# Patient Record
Sex: FEMALE | Race: WHITE | HISPANIC OR LATINO | Employment: UNEMPLOYED | URBAN - METROPOLITAN AREA
[De-identification: names, ages, dates, MRNs, and addresses within clinical notes are randomized per-mention and may not be internally consistent; named-entity substitution may affect disease eponyms.]

---

## 2019-07-15 ENCOUNTER — HOSPITAL ENCOUNTER (EMERGENCY)
Facility: MEDICAL CENTER | Age: 24
End: 2019-07-15
Attending: EMERGENCY MEDICINE

## 2019-07-15 ENCOUNTER — APPOINTMENT (OUTPATIENT)
Dept: RADIOLOGY | Facility: MEDICAL CENTER | Age: 24
End: 2019-07-15
Attending: EMERGENCY MEDICINE

## 2019-07-15 VITALS
TEMPERATURE: 98.4 F | BODY MASS INDEX: 34.71 KG/M2 | OXYGEN SATURATION: 99 % | HEIGHT: 55 IN | WEIGHT: 150 LBS | SYSTOLIC BLOOD PRESSURE: 108 MMHG | DIASTOLIC BLOOD PRESSURE: 75 MMHG | RESPIRATION RATE: 16 BRPM | HEART RATE: 66 BPM

## 2019-07-15 DIAGNOSIS — N12 PYELONEPHRITIS: ICD-10-CM

## 2019-07-15 LAB
ALBUMIN SERPL BCP-MCNC: 4.1 G/DL (ref 3.2–4.9)
ALBUMIN/GLOB SERPL: 1.3 G/DL
ALP SERPL-CCNC: 64 U/L (ref 30–99)
ALT SERPL-CCNC: 10 U/L (ref 2–50)
ANION GAP SERPL CALC-SCNC: 5 MMOL/L (ref 0–11.9)
APPEARANCE UR: ABNORMAL
AST SERPL-CCNC: 10 U/L (ref 12–45)
BACTERIA #/AREA URNS HPF: ABNORMAL /HPF
BASOPHILS # BLD AUTO: 0.3 % (ref 0–1.8)
BASOPHILS # BLD: 0.03 K/UL (ref 0–0.12)
BILIRUB SERPL-MCNC: 0.3 MG/DL (ref 0.1–1.5)
BILIRUB UR QL STRIP.AUTO: NEGATIVE
BUN SERPL-MCNC: 16 MG/DL (ref 8–22)
CALCIUM SERPL-MCNC: 9.3 MG/DL (ref 8.5–10.5)
CHLORIDE SERPL-SCNC: 107 MMOL/L (ref 96–112)
CO2 SERPL-SCNC: 25 MMOL/L (ref 20–33)
COLOR UR: YELLOW
CREAT SERPL-MCNC: 0.71 MG/DL (ref 0.5–1.4)
EOSINOPHIL # BLD AUTO: 0.19 K/UL (ref 0–0.51)
EOSINOPHIL NFR BLD: 2 % (ref 0–6.9)
EPI CELLS #/AREA URNS HPF: ABNORMAL /HPF
ERYTHROCYTE [DISTWIDTH] IN BLOOD BY AUTOMATED COUNT: 42.2 FL (ref 35.9–50)
GLOBULIN SER CALC-MCNC: 3.1 G/DL (ref 1.9–3.5)
GLUCOSE SERPL-MCNC: 103 MG/DL (ref 65–99)
GLUCOSE UR STRIP.AUTO-MCNC: NEGATIVE MG/DL
HCG SERPL QL: NEGATIVE
HCT VFR BLD AUTO: 37.5 % (ref 37–47)
HGB BLD-MCNC: 11.9 G/DL (ref 12–16)
HYALINE CASTS #/AREA URNS LPF: ABNORMAL /LPF
IMM GRANULOCYTES # BLD AUTO: 0.02 K/UL (ref 0–0.11)
IMM GRANULOCYTES NFR BLD AUTO: 0.2 % (ref 0–0.9)
KETONES UR STRIP.AUTO-MCNC: NEGATIVE MG/DL
LEUKOCYTE ESTERASE UR QL STRIP.AUTO: ABNORMAL
LIPASE SERPL-CCNC: 39 U/L (ref 11–82)
LYMPHOCYTES # BLD AUTO: 4.32 K/UL (ref 1–4.8)
LYMPHOCYTES NFR BLD: 45.7 % (ref 22–41)
MCH RBC QN AUTO: 28.6 PG (ref 27–33)
MCHC RBC AUTO-ENTMCNC: 31.7 G/DL (ref 33.6–35)
MCV RBC AUTO: 90.1 FL (ref 81.4–97.8)
MICRO URNS: ABNORMAL
MONOCYTES # BLD AUTO: 0.61 K/UL (ref 0–0.85)
MONOCYTES NFR BLD AUTO: 6.4 % (ref 0–13.4)
NEUTROPHILS # BLD AUTO: 4.29 K/UL (ref 2–7.15)
NEUTROPHILS NFR BLD: 45.4 % (ref 44–72)
NITRITE UR QL STRIP.AUTO: NEGATIVE
NRBC # BLD AUTO: 0 K/UL
NRBC BLD-RTO: 0 /100 WBC
PH UR STRIP.AUTO: 5.5 [PH]
PLATELET # BLD AUTO: 231 K/UL (ref 164–446)
PMV BLD AUTO: 9.7 FL (ref 9–12.9)
POTASSIUM SERPL-SCNC: 3.7 MMOL/L (ref 3.6–5.5)
PROT SERPL-MCNC: 7.2 G/DL (ref 6–8.2)
PROT UR QL STRIP: 100 MG/DL
RBC # BLD AUTO: 4.16 M/UL (ref 4.2–5.4)
RBC # URNS HPF: ABNORMAL /HPF
RBC UR QL AUTO: ABNORMAL
RENAL EPI CELLS #/AREA URNS HPF: ABNORMAL /HPF
SODIUM SERPL-SCNC: 137 MMOL/L (ref 135–145)
SP GR UR STRIP.AUTO: 1.04
UROBILINOGEN UR STRIP.AUTO-MCNC: 1 MG/DL
WBC # BLD AUTO: 9.5 K/UL (ref 4.8–10.8)
WBC #/AREA URNS HPF: ABNORMAL /HPF

## 2019-07-15 PROCEDURE — 87086 URINE CULTURE/COLONY COUNT: CPT

## 2019-07-15 PROCEDURE — 99285 EMERGENCY DEPT VISIT HI MDM: CPT

## 2019-07-15 PROCEDURE — 80053 COMPREHEN METABOLIC PANEL: CPT

## 2019-07-15 PROCEDURE — 74176 CT ABD & PELVIS W/O CONTRAST: CPT

## 2019-07-15 PROCEDURE — 700111 HCHG RX REV CODE 636 W/ 250 OVERRIDE (IP): Performed by: EMERGENCY MEDICINE

## 2019-07-15 PROCEDURE — 96375 TX/PRO/DX INJ NEW DRUG ADDON: CPT

## 2019-07-15 PROCEDURE — 700105 HCHG RX REV CODE 258: Performed by: EMERGENCY MEDICINE

## 2019-07-15 PROCEDURE — 81001 URINALYSIS AUTO W/SCOPE: CPT

## 2019-07-15 PROCEDURE — 85025 COMPLETE CBC W/AUTO DIFF WBC: CPT

## 2019-07-15 PROCEDURE — 84703 CHORIONIC GONADOTROPIN ASSAY: CPT

## 2019-07-15 PROCEDURE — 96365 THER/PROPH/DIAG IV INF INIT: CPT

## 2019-07-15 PROCEDURE — 83690 ASSAY OF LIPASE: CPT

## 2019-07-15 RX ORDER — CEFDINIR 300 MG/1
300 CAPSULE ORAL 2 TIMES DAILY
Qty: 18 CAP | Refills: 0 | Status: SHIPPED | OUTPATIENT
Start: 2019-07-16 | End: 2019-07-25

## 2019-07-15 RX ORDER — KETOROLAC TROMETHAMINE 30 MG/ML
30 INJECTION, SOLUTION INTRAMUSCULAR; INTRAVENOUS ONCE
Status: COMPLETED | OUTPATIENT
Start: 2019-07-15 | End: 2019-07-15

## 2019-07-15 RX ORDER — PROMETHAZINE HYDROCHLORIDE 25 MG/1
25 TABLET ORAL EVERY 6 HOURS PRN
Qty: 10 TAB | Refills: 0 | Status: ON HOLD | OUTPATIENT
Start: 2019-07-15 | End: 2021-03-27

## 2019-07-15 RX ADMIN — KETOROLAC TROMETHAMINE 30 MG: 30 INJECTION, SOLUTION INTRAMUSCULAR at 22:32

## 2019-07-15 RX ADMIN — CEFTRIAXONE SODIUM 1 G: 1 INJECTION, POWDER, FOR SOLUTION INTRAMUSCULAR; INTRAVENOUS at 22:32

## 2019-07-15 ASSESSMENT — LIFESTYLE VARIABLES: DO YOU DRINK ALCOHOL: NO

## 2019-07-16 NOTE — ED NOTES
Pt discharged home via ambulatory to [facility] with steady gait, AOx4, family accompanying. Interpretive services utilized for discharge education. IV discontinued and gauze placed, pt in possession of belongings. Pt provided discharge education and information pertaining to medications and follow up appointments. Pt received copy of discharge instructions and verbalized understanding.

## 2019-07-16 NOTE — ED PROVIDER NOTES
"ED Provider Note    CHIEF COMPLAINT  Chief Complaint   Patient presents with   • Abdominal Pain       HPI  Kash Patel is a 23 y.o. female who presents with 3 days of left upper quadrant abdominal pain, left flank pain and dysuria.  No fever.  Intermittent nausea, states she vomited once today.  Patient denies pregnancy.  No chest pain or shortness of breath.  Symptoms are gradual onset while at rest at home.  Pain is described as dull in nature.   was used for the interview and explanation of testing.    REVIEW OF SYSTEMS  Constitutional: No fever  Respiratory: No shortness of breath  Cardiac: No chest pain or syncope  Gastrointestinal: Abdominal pain, nausea  Musculoskeletal: Left flank pain  Neurologic: No headache  Genitourinary: Dysuria.  Patient denies pregnancy       All other systems are negative.     PAST MEDICAL HISTORY  History reviewed. No pertinent past medical history.    FAMILY HISTORY  History reviewed. No pertinent family history.    SOCIAL HISTORY  Social History     Social History   • Marital status:      Spouse name: N/A   • Number of children: N/A   • Years of education: N/A     Social History Main Topics   • Smoking status: Never Smoker   • Smokeless tobacco: Never Used   • Alcohol use No   • Drug use: No   • Sexual activity: Not on file     Other Topics Concern   • Not on file     Social History Narrative   • No narrative on file       SURGICAL HISTORY  Past Surgical History:   Procedure Laterality Date   • PRIMARY C SECTION         CURRENT MEDICATIONS  Home Medications    **Home medications have not yet been reviewed for this encounter**         ALLERGIES  No Known Allergies    PHYSICAL EXAM  VITAL SIGNS: /75   Pulse 66   Temp 36.9 °C (98.4 °F) (Temporal)   Resp 16   Ht 1.25 m (4' 1.21\")   Wt 68 kg (150 lb)   SpO2 99%   BMI 43.54 kg/m²   Constitutional: Well-nourished, no distress  ENT: Nares clear, mucous membranes moist.  Eyes:  " Conjunctiva normal, No discharge.    Lymphatic: No adenopathy.   Cardiovascular: Normal heart rate, Normal rhythm.   Pulmonary: No wheezing, no rales  Gastrointestinal: Mild left upper quadrant tenderness.  Mild suprapubic tenderness.  No pain over McBurney's point.  Negative Mcdonough sign.  No distention  Skin: Warm, Dry, No jaundice.   Musculoskeletal: Left CVA tenderness.   Neurologic:  Normal motor and sensory function, No focal deficits noted.   Psychiatric:Normal affect, Normal mood.    RADIOLOGY/PROCEDURES/Labs  Results for orders placed or performed during the hospital encounter of 07/15/19   CBC WITH DIFFERENTIAL   Result Value Ref Range    WBC 9.5 4.8 - 10.8 K/uL    RBC 4.16 (L) 4.20 - 5.40 M/uL    Hemoglobin 11.9 (L) 12.0 - 16.0 g/dL    Hematocrit 37.5 37.0 - 47.0 %    MCV 90.1 81.4 - 97.8 fL    MCH 28.6 27.0 - 33.0 pg    MCHC 31.7 (L) 33.6 - 35.0 g/dL    RDW 42.2 35.9 - 50.0 fL    Platelet Count 231 164 - 446 K/uL    MPV 9.7 9.0 - 12.9 fL    Neutrophils-Polys 45.40 44.00 - 72.00 %    Lymphocytes 45.70 (H) 22.00 - 41.00 %    Monocytes 6.40 0.00 - 13.40 %    Eosinophils 2.00 0.00 - 6.90 %    Basophils 0.30 0.00 - 1.80 %    Immature Granulocytes 0.20 0.00 - 0.90 %    Nucleated RBC 0.00 /100 WBC    Neutrophils (Absolute) 4.29 2.00 - 7.15 K/uL    Lymphs (Absolute) 4.32 1.00 - 4.80 K/uL    Monos (Absolute) 0.61 0.00 - 0.85 K/uL    Eos (Absolute) 0.19 0.00 - 0.51 K/uL    Baso (Absolute) 0.03 0.00 - 0.12 K/uL    Immature Granulocytes (abs) 0.02 0.00 - 0.11 K/uL    NRBC (Absolute) 0.00 K/uL   COMP METABOLIC PANEL   Result Value Ref Range    Sodium 137 135 - 145 mmol/L    Potassium 3.7 3.6 - 5.5 mmol/L    Chloride 107 96 - 112 mmol/L    Co2 25 20 - 33 mmol/L    Anion Gap 5.0 0.0 - 11.9    Glucose 103 (H) 65 - 99 mg/dL    Bun 16 8 - 22 mg/dL    Creatinine 0.71 0.50 - 1.40 mg/dL    Calcium 9.3 8.5 - 10.5 mg/dL    AST(SGOT) 10 (L) 12 - 45 U/L    ALT(SGPT) 10 2 - 50 U/L    Alkaline Phosphatase 64 30 - 99 U/L    Total  Bilirubin 0.3 0.1 - 1.5 mg/dL    Albumin 4.1 3.2 - 4.9 g/dL    Total Protein 7.2 6.0 - 8.2 g/dL    Globulin 3.1 1.9 - 3.5 g/dL    A-G Ratio 1.3 g/dL   LIPASE   Result Value Ref Range    Lipase 39 11 - 82 U/L   HCG QUAL SERUM   Result Value Ref Range    Beta-Hcg Qualitative Serum Negative Negative   URINALYSIS,CULTURE IF INDICATED   Result Value Ref Range    Color Yellow     Character Cloudy (A)     Specific Gravity 1.041 <1.035    Ph 5.5 5.0 - 8.0    Glucose Negative Negative mg/dL    Ketones Negative Negative mg/dL    Protein 100 (A) Negative mg/dL    Bilirubin Negative Negative    Urobilinogen, Urine 1.0 Negative    Nitrite Negative Negative    Leukocyte Esterase Trace (A) Negative    Occult Blood Large (A) Negative    Micro Urine Req Microscopic    URINE MICROSCOPIC (W/UA)   Result Value Ref Range    WBC 10-20 (A) /hpf    RBC 20-50 (A) /hpf    Bacteria Moderate (A) None /hpf    Epithelial Cells Many (A) /hpf    Epithelial Cells Renal Rare /hpf    Hyaline Cast 11-20 (A) /lpf   ESTIMATED GFR   Result Value Ref Range    GFR If African American >60 >60 mL/min/1.73 m 2    GFR If Non African American >60 >60 mL/min/1.73 m 2     CT-RENAL COLIC EVALUATION(A/P W/O)   Final Result         1.  No acute abnormality.            COURSE & MEDICAL DECISION MAKING  Pertinent Labs & Imaging studies reviewed. (See chart for details)  Urine will be sent for culture.  Patient started on Rocephin, will continue on Omnicef.  Phenergan prescribed for nausea.  She is advised to return if worse, to see her doctor for recheck if no better in 2 days.  Signs and symptoms are consistent with pyelonephritis.  No kidney stones were seen on CT scan, no other abnormalities on CT scan    FINAL IMPRESSION  1. Pyelonephritis            Electronically signed by: Dao Diaz, 7/16/2019 12:55 AM

## 2019-07-16 NOTE — ED TRIAGE NOTES
Chief Complaint   Patient presents with   • Abdominal Pain     LUQ radiates to RUQ and back.  States pain is burning, nothing makes it better or worse.  Pain began 3 days ago while at rest.  No trouble urinating.  No trauma.  Triage process explained to patient.  Pt back to waiting room.  Pt instructed to inform RN if any changes or questions arise.

## 2019-07-18 LAB
BACTERIA UR CULT: NORMAL
SIGNIFICANT IND 70042: NORMAL
SITE SITE: NORMAL
SOURCE SOURCE: NORMAL

## 2019-09-13 ENCOUNTER — HOSPITAL ENCOUNTER (EMERGENCY)
Facility: MEDICAL CENTER | Age: 24
End: 2019-09-14
Attending: EMERGENCY MEDICINE

## 2019-09-13 ENCOUNTER — APPOINTMENT (OUTPATIENT)
Dept: RADIOLOGY | Facility: MEDICAL CENTER | Age: 24
End: 2019-09-13
Attending: EMERGENCY MEDICINE

## 2019-09-13 DIAGNOSIS — R10.2 PELVIC PAIN: ICD-10-CM

## 2019-09-13 LAB
ALBUMIN SERPL BCP-MCNC: 4.4 G/DL (ref 3.2–4.9)
ALBUMIN/GLOB SERPL: 1.3 G/DL
ALP SERPL-CCNC: 59 U/L (ref 30–99)
ALT SERPL-CCNC: 11 U/L (ref 2–50)
ANION GAP SERPL CALC-SCNC: 8 MMOL/L (ref 0–11.9)
APPEARANCE UR: CLEAR
AST SERPL-CCNC: 12 U/L (ref 12–45)
BACTERIA GENITAL QL WET PREP: NORMAL
BASOPHILS # BLD AUTO: 0.3 % (ref 0–1.8)
BASOPHILS # BLD: 0.04 K/UL (ref 0–0.12)
BILIRUB SERPL-MCNC: 0.3 MG/DL (ref 0.1–1.5)
BILIRUB UR QL STRIP.AUTO: NEGATIVE
BUN SERPL-MCNC: 14 MG/DL (ref 8–22)
CALCIUM SERPL-MCNC: 9.5 MG/DL (ref 8.5–10.5)
CHLORIDE SERPL-SCNC: 105 MMOL/L (ref 96–112)
CO2 SERPL-SCNC: 25 MMOL/L (ref 20–33)
COLOR UR: YELLOW
CREAT SERPL-MCNC: 0.77 MG/DL (ref 0.5–1.4)
EOSINOPHIL # BLD AUTO: 0.16 K/UL (ref 0–0.51)
EOSINOPHIL NFR BLD: 1.4 % (ref 0–6.9)
ERYTHROCYTE [DISTWIDTH] IN BLOOD BY AUTOMATED COUNT: 42.4 FL (ref 35.9–50)
GLOBULIN SER CALC-MCNC: 3.4 G/DL (ref 1.9–3.5)
GLUCOSE SERPL-MCNC: 97 MG/DL (ref 65–99)
GLUCOSE UR STRIP.AUTO-MCNC: NEGATIVE MG/DL
HCG SERPL QL: NEGATIVE
HCT VFR BLD AUTO: 39.9 % (ref 37–47)
HGB BLD-MCNC: 12.7 G/DL (ref 12–16)
IMM GRANULOCYTES # BLD AUTO: 0.03 K/UL (ref 0–0.11)
IMM GRANULOCYTES NFR BLD AUTO: 0.3 % (ref 0–0.9)
KETONES UR STRIP.AUTO-MCNC: NEGATIVE MG/DL
LEUKOCYTE ESTERASE UR QL STRIP.AUTO: NEGATIVE
LIPASE SERPL-CCNC: 36 U/L (ref 11–82)
LYMPHOCYTES # BLD AUTO: 3.85 K/UL (ref 1–4.8)
LYMPHOCYTES NFR BLD: 33.5 % (ref 22–41)
MCH RBC QN AUTO: 29.2 PG (ref 27–33)
MCHC RBC AUTO-ENTMCNC: 31.8 G/DL (ref 33.6–35)
MCV RBC AUTO: 91.7 FL (ref 81.4–97.8)
MICRO URNS: NORMAL
MONOCYTES # BLD AUTO: 0.55 K/UL (ref 0–0.85)
MONOCYTES NFR BLD AUTO: 4.8 % (ref 0–13.4)
NEUTROPHILS # BLD AUTO: 6.85 K/UL (ref 2–7.15)
NEUTROPHILS NFR BLD: 59.7 % (ref 44–72)
NITRITE UR QL STRIP.AUTO: NEGATIVE
NRBC # BLD AUTO: 0 K/UL
NRBC BLD-RTO: 0 /100 WBC
PH UR STRIP.AUTO: 5.5 [PH] (ref 5–8)
PLATELET # BLD AUTO: 248 K/UL (ref 164–446)
PMV BLD AUTO: 9.5 FL (ref 9–12.9)
POTASSIUM SERPL-SCNC: 3.5 MMOL/L (ref 3.6–5.5)
PROT SERPL-MCNC: 7.8 G/DL (ref 6–8.2)
PROT UR QL STRIP: NEGATIVE MG/DL
RBC # BLD AUTO: 4.35 M/UL (ref 4.2–5.4)
RBC UR QL AUTO: NEGATIVE
SIGNIFICANT IND 70042: NORMAL
SITE SITE: NORMAL
SODIUM SERPL-SCNC: 138 MMOL/L (ref 135–145)
SOURCE SOURCE: NORMAL
SP GR UR STRIP.AUTO: 1.03
TROPONIN T SERPL-MCNC: <6 NG/L (ref 6–19)
UROBILINOGEN UR STRIP.AUTO-MCNC: 0.2 MG/DL
WBC # BLD AUTO: 11.5 K/UL (ref 4.8–10.8)

## 2019-09-13 PROCEDURE — 87491 CHLMYD TRACH DNA AMP PROBE: CPT

## 2019-09-13 PROCEDURE — 87660 TRICHOMONAS VAGIN DIR PROBE: CPT

## 2019-09-13 PROCEDURE — 99285 EMERGENCY DEPT VISIT HI MDM: CPT

## 2019-09-13 PROCEDURE — 87510 GARDNER VAG DNA DIR PROBE: CPT

## 2019-09-13 PROCEDURE — 87591 N.GONORRHOEAE DNA AMP PROB: CPT

## 2019-09-13 PROCEDURE — 93005 ELECTROCARDIOGRAM TRACING: CPT

## 2019-09-13 PROCEDURE — 36415 COLL VENOUS BLD VENIPUNCTURE: CPT

## 2019-09-13 PROCEDURE — 93005 ELECTROCARDIOGRAM TRACING: CPT | Performed by: EMERGENCY MEDICINE

## 2019-09-13 PROCEDURE — 84703 CHORIONIC GONADOTROPIN ASSAY: CPT

## 2019-09-13 PROCEDURE — 87480 CANDIDA DNA DIR PROBE: CPT

## 2019-09-13 PROCEDURE — 80053 COMPREHEN METABOLIC PANEL: CPT

## 2019-09-13 PROCEDURE — 85025 COMPLETE CBC W/AUTO DIFF WBC: CPT

## 2019-09-13 PROCEDURE — 71045 X-RAY EXAM CHEST 1 VIEW: CPT

## 2019-09-13 PROCEDURE — 83690 ASSAY OF LIPASE: CPT

## 2019-09-13 PROCEDURE — 81003 URINALYSIS AUTO W/O SCOPE: CPT

## 2019-09-13 PROCEDURE — 84484 ASSAY OF TROPONIN QUANT: CPT

## 2019-09-14 VITALS
HEIGHT: 59 IN | DIASTOLIC BLOOD PRESSURE: 62 MMHG | WEIGHT: 128.53 LBS | SYSTOLIC BLOOD PRESSURE: 103 MMHG | OXYGEN SATURATION: 98 % | BODY MASS INDEX: 25.91 KG/M2 | TEMPERATURE: 97.9 F | HEART RATE: 60 BPM | RESPIRATION RATE: 16 BRPM

## 2019-09-14 LAB
C TRACH DNA SPEC QL NAA+PROBE: NEGATIVE
CANDIDA DNA VAG QL PROBE+SIG AMP: NEGATIVE
EKG IMPRESSION: NORMAL
G VAGINALIS DNA VAG QL PROBE+SIG AMP: NEGATIVE
N GONORRHOEA DNA SPEC QL NAA+PROBE: NEGATIVE
SPECIMEN SOURCE: NORMAL
T VAGINALIS DNA VAG QL PROBE+SIG AMP: NEGATIVE

## 2019-09-14 PROCEDURE — 700111 HCHG RX REV CODE 636 W/ 250 OVERRIDE (IP): Performed by: EMERGENCY MEDICINE

## 2019-09-14 PROCEDURE — 76856 US EXAM PELVIC COMPLETE: CPT

## 2019-09-14 RX ORDER — KETOROLAC TROMETHAMINE 30 MG/ML
15 INJECTION, SOLUTION INTRAMUSCULAR; INTRAVENOUS ONCE
Status: COMPLETED | OUTPATIENT
Start: 2019-09-14 | End: 2019-09-14

## 2019-09-14 RX ADMIN — KETOROLAC TROMETHAMINE 15 MG: 30 INJECTION, SOLUTION INTRAMUSCULAR at 00:57

## 2019-09-14 NOTE — ED NOTES
"PT denies trauma to abdomen, vagina or chest.    Pt reports that all symptoms started this am, pt reports that she felt like she was getting an infection so she used a \"vaginal cream\" with plastic applicator, pt unsure which cream she used. Pt reports that she thinks she cut herself with the applicator, when she removed it it had blood on it. Pt denies any vaginal bleeding at this time.     Pt also reports that her abdominal pain originats in her back, radiates sharply to front. denies cardiac history.     BP R arm 106/68, HR 64.  BP L arm 115/72, HR 81.  "

## 2019-09-14 NOTE — ED PROVIDER NOTES
"ED Provider Note    Scribed for Dao Haro M.D. by Lester Vega. 9/13/2019  10:09 PM    Primary care provider: None noted.   Means of arrival: Walk-in  History obtained from: Patient  History limited by: None    CHIEF COMPLAINT  Chief Complaint   Patient presents with   • Abdominal Pain     Started in the AM, denies trauma. Denies vomiting, diarrhea.    • Dizziness     Started this am, denies syncope.   • Chest Pain     Started this am. Described as tight, \"bad omen\"       HPI  Kash Patel is a 23 y.o. female who presents to the Emergency Department with acute, moderate abdominal pain onset this morning. Patient endorses associated dizziness. Denies any associated fever, vomiting, vaginal bleeding or discharge. There are no known alleviating or exacerbating factors. Patient states that 2 days ago she applied a vaginal cream when she pulled out applicator there was blood. She states that she used the cream because she suspected a vaginal infection due to discharge at that time. Her LMP was on the 20th, and is usually at the end of the month. She notes that 1 month ago she had a kidney infection. Patient had a past surgical history of Caesarian section.  She does not currently follow with any gynecologist.       REVIEW OF SYSTEMS  Pertinent positives include abdominal pain and dizziness. Pertinent negatives include no fever, vomiting, vaginal bleeding or discharge..  All other systems reviewed and negative.    PAST MEDICAL HISTORY       SURGICAL HISTORY   has a past surgical history that includes primary c section.    SOCIAL HISTORY  Social History     Tobacco Use   • Smoking status: Never Smoker   • Smokeless tobacco: Never Used   Substance Use Topics   • Alcohol use: No   • Drug use: No      Social History     Substance and Sexual Activity   Drug Use No       FAMILY HISTORY  History reviewed. No pertinent family history.    CURRENT MEDICATIONS  Current Outpatient Medications:   •  promethazine " "(PHENERGAN) 25 MG Tab, Take 1 Tab by mouth every 6 hours as needed for Nausea/Vomiting., Disp: 10 Tab, Rfl: 0    ALLERGIES  No Known Allergies    PHYSICAL EXAM  VITAL SIGNS: /68   Pulse 64   Temp 36.6 °C (97.9 °F)   Resp 16   Ht 1.499 m (4' 11\")   Wt 58.3 kg (128 lb 8.5 oz)   SpO2 98%   BMI 25.96 kg/m²   Pulse ox interpretation: Normal  Constitutional: Well developed, Well nourished, No acute distress, Non-toxic appearance.   HENT: Normocephalic, Atraumatic, Bilateral external ears normal, Oropharynx moist, No oral exudates, Nose normal.   Eyes: PERRLA, EOMI, Conjunctiva normal, No discharge.   Neck: Normal range of motion, No tenderness, Supple, No stridor.   Cardiovascular: Normal heart rate, Normal rhythm, No murmurs, No rubs, No gallops.   Thorax & Lungs: Normal breath sounds, No respiratory distress, No wheezing, No chest tenderness.   Abdomen: Bowel sounds normal, Soft, very mild bilateral lower quadrant tenderness, No masses, No pulsatile masses.   Pelvic: Ectropion cervix.  Scant clear discharge.  No obvious signs of trauma.  No cervical motion tenderness.  Skin: Warm, Dry, No erythema, No rash.   Back: No tenderness, No CVA tenderness.   Extremities: Intact distal pulses, No edema, No tenderness, No cyanosis, No clubbing.   Musculoskeletal: Good range of motion in all major joints. No tenderness to palpation or major deformities noted.   Neurologic: Alert & oriented x 3, No focal deficits noted.       LABS  Labs Reviewed   CBC WITH DIFFERENTIAL - Abnormal; Notable for the following components:       Result Value    WBC 11.5 (*)     MCHC 31.8 (*)     All other components within normal limits   COMP METABOLIC PANEL - Abnormal; Notable for the following components:    Potassium 3.5 (*)     All other components within normal limits   TROPONIN   LIPASE   HCG QUAL SERUM   URINALYSIS,CULTURE IF INDICATED   ESTIMATED GFR   WET PREP   CHLAMYDIA/GC PCR URINE OR SWAB   VAGINAL PATHOGENS DNA PANEL     All " labs reviewed by me.    EKG  Results for orders placed or performed during the hospital encounter of 19   EKG   Result Value Ref Range    Report       [facility] Emergency Dept.    Test Date:  2019  Pt Name:    MEL WYLIE       Department: ER  MRN:        [id number]                      Room:  Gender:     Female                       Technician: [id number]  :        1995                   Requested By:ER TRIAGE PROTOCOL  Order #:    [id number]                    Reading MD: JANNA WIGGINS MD    Measurements  Intervals                                Axis  Rate:       80                           P:          57  AK:         140                          QRS:        86  QRSD:       82                           T:          27  QT:         376  QTc:        434    Interpretive Statements  SINUS RHYTHM  No previous ECG available for comparison    Electronically Signed On 2019 0:36:29 PDT by JANNA WIGGINS MD           RADIOLOGY  US-PELVIC COMPLETE (TRANSABDOMINAL/TRANSVAGINAL) (COMBO)   Final Result            Thickened endometrium with trace amount fluid in the cervix, could still be within normal limits for a premenstrual female.      Moderate free fluid extending from the right adnexa and cul-de-sac. Ruptured right ovarian cyst is possible.      Prominent bilateral ovarian follicles.      DX-CHEST-PORTABLE (1 VIEW)   Final Result         1. No acute cardiopulmonary abnormalities are identified.        The radiologist's interpretation of all radiological studies have been reviewed by me.    COURSE & MEDICAL DECISION MAKING  Pertinent Labs & Imaging studies reviewed. (See chart for details)    10:09 PM - Ordered DX-chest, estimated GFR, CBC with diff, CMP, lipase, HCG qual serum, urinalysis culture, troponin, and EKG to evaluate her symptoms.     10:55 PM - Patient seen at bedside. I informed her that her labs so far were reassuring, but I would like to additionally to a pelvic  "exam and possibly an ultrasound. Patient was agreeable to updated plan of care.     11:16 PM - Ordered US-pelvic complete, wet prep, and chlamydia/GC by PCR      Decision Making:  This is a 23 y.o. year old female who presents with abdominal pain to the lower abdomen.  No associated vomiting or diarrhea.  No fevers.  Did note some chest pain to triage upon arrival.  No prior cardiovascular disease history.  No shortness of breath or cough.    Pelvic examination was performed.  No obvious signs of cervical motion tenderness.  No obvious treatable underlying infection.  Pelvic ultrasound was performed as well.  Showing some free fluid in the right lower quadrant which may be a sign of a ruptured cyst.  No other obvious abnormalities.  No evidence of torsion.  Patient is not pregnant.    No evidence of urinary tract infection or hematuria to suggest a urinary pathologic process    Unclear etiology for the patient's abdominal pain symptoms at this time.  Recommending outpatient management with the primary care physician.  Due to the patient's abbreviated menstrual cycle last month, this may be a beginning of menstrual cycle resulting in pain.  Endometriosis is also a possibility.  Symptoms are not consistent with appendicitis.  Laboratory studies are largely unremarkable.    [facility], Emergency Dept  [address] [place] [id number] [phone]    As needed, If symptoms worsen    Primary care doctor    Schedule an appointment as soon as possible for a visit       /62   Pulse 60   Temp 36.6 °C (97.9 °F) (Oral)   Resp 16   Ht 1.499 m (4' 11\")   Wt 58.3 kg (128 lb 8.5 oz)   SpO2 98%   BMI 25.96 kg/m²     FINAL IMPRESSION  1. Pelvic pain         This dictation has been created using voice recognition software and/or scribes. The accuracy of the dictation is limited by the abilities of the software and the expertise of the scribes. I expect there may be some errors of grammar and " possibly content. I made every attempt to manually correct the errors within my dictation. However, errors related to voice recognition software and/or scribes may still exist and should be interpreted within the appropriate context.    I, Dao Haro M.D. personally performed the services described in this documentation, as scribed by Lester Vega in my presence, and it is both accurate and complete.    C.    The note accurately reflects work and decisions made by me.  Dao Haro  9/14/2019  2:03 AM

## 2019-09-14 NOTE — ED TRIAGE NOTES
"Chief Complaint   Patient presents with   • Abdominal Pain     Started in the AM, denies trauma. Denies vomiting, diarrhea.    • Dizziness     Started this am, denies syncope.   • Chest Pain     Started this am. Described as tight, \"bad omen\"     /68   Pulse 64   Temp 36.6 °C (97.9 °F)   Resp 16   Ht 1.499 m (4' 11\")   Wt 58.3 kg (128 lb 8.5 oz)   SpO2 98%   BMI 25.96 kg/m²     Pt to ER for above complaint.    EKG and protocol ordered.  "

## 2019-09-14 NOTE — ED NOTES
Pt given discharge and follow up instructions, all questions answered, , pt verbalized understanding via ipad . Pt discharged with spouse. Copy of discharge provided to pt. Signed copy in chart.  Pt states that all personal belongings are in possession. Pt off unit w/ steady gait.

## 2021-02-04 ENCOUNTER — APPOINTMENT (OUTPATIENT)
Dept: RADIOLOGY | Facility: MEDICAL CENTER | Age: 26
End: 2021-02-04
Attending: EMERGENCY MEDICINE

## 2021-02-04 ENCOUNTER — HOSPITAL ENCOUNTER (EMERGENCY)
Facility: MEDICAL CENTER | Age: 26
End: 2021-02-04
Attending: EMERGENCY MEDICINE

## 2021-02-04 VITALS
TEMPERATURE: 98 F | RESPIRATION RATE: 16 BRPM | SYSTOLIC BLOOD PRESSURE: 101 MMHG | HEART RATE: 71 BPM | WEIGHT: 132.72 LBS | DIASTOLIC BLOOD PRESSURE: 63 MMHG | OXYGEN SATURATION: 100 % | HEIGHT: 59 IN | BODY MASS INDEX: 26.76 KG/M2

## 2021-02-04 DIAGNOSIS — O41.8X10 SUBCHORIONIC HEMORRHAGE OF PLACENTA IN FIRST TRIMESTER, SINGLE OR UNSPECIFIED FETUS: ICD-10-CM

## 2021-02-04 DIAGNOSIS — Z3A.01 LESS THAN 8 WEEKS GESTATION OF PREGNANCY: ICD-10-CM

## 2021-02-04 DIAGNOSIS — O46.8X1 SUBCHORIONIC HEMORRHAGE OF PLACENTA IN FIRST TRIMESTER, SINGLE OR UNSPECIFIED FETUS: ICD-10-CM

## 2021-02-04 LAB
ALBUMIN SERPL BCP-MCNC: 4.3 G/DL (ref 3.2–4.9)
ALBUMIN/GLOB SERPL: 1.2 G/DL
ALP SERPL-CCNC: 68 U/L (ref 30–99)
ALT SERPL-CCNC: 11 U/L (ref 2–50)
ANION GAP SERPL CALC-SCNC: 12 MMOL/L (ref 7–16)
APPEARANCE UR: CLEAR
AST SERPL-CCNC: 11 U/L (ref 12–45)
B-HCG SERPL-ACNC: ABNORMAL MIU/ML (ref 0–5)
BASOPHILS # BLD AUTO: 0.5 % (ref 0–1.8)
BASOPHILS # BLD: 0.05 K/UL (ref 0–0.12)
BILIRUB SERPL-MCNC: <0.2 MG/DL (ref 0.1–1.5)
BILIRUB UR QL STRIP.AUTO: NEGATIVE
BUN SERPL-MCNC: 12 MG/DL (ref 8–22)
CALCIUM SERPL-MCNC: 9.8 MG/DL (ref 8.5–10.5)
CHLORIDE SERPL-SCNC: 102 MMOL/L (ref 96–112)
CO2 SERPL-SCNC: 25 MMOL/L (ref 20–33)
COLOR UR: YELLOW
CREAT SERPL-MCNC: 0.48 MG/DL (ref 0.5–1.4)
EOSINOPHIL # BLD AUTO: 0.15 K/UL (ref 0–0.51)
EOSINOPHIL NFR BLD: 1.4 % (ref 0–6.9)
ERYTHROCYTE [DISTWIDTH] IN BLOOD BY AUTOMATED COUNT: 41.3 FL (ref 35.9–50)
GLOBULIN SER CALC-MCNC: 3.7 G/DL (ref 1.9–3.5)
GLUCOSE SERPL-MCNC: 86 MG/DL (ref 65–99)
GLUCOSE UR STRIP.AUTO-MCNC: NEGATIVE MG/DL
HCT VFR BLD AUTO: 38.9 % (ref 37–47)
HGB BLD-MCNC: 13 G/DL (ref 12–16)
IMM GRANULOCYTES # BLD AUTO: 0.04 K/UL (ref 0–0.11)
IMM GRANULOCYTES NFR BLD AUTO: 0.4 % (ref 0–0.9)
KETONES UR STRIP.AUTO-MCNC: NEGATIVE MG/DL
LEUKOCYTE ESTERASE UR QL STRIP.AUTO: NEGATIVE
LIPASE SERPL-CCNC: 58 U/L (ref 11–82)
LYMPHOCYTES # BLD AUTO: 2.94 K/UL (ref 1–4.8)
LYMPHOCYTES NFR BLD: 26.6 % (ref 22–41)
MCH RBC QN AUTO: 30 PG (ref 27–33)
MCHC RBC AUTO-ENTMCNC: 33.4 G/DL (ref 33.6–35)
MCV RBC AUTO: 89.8 FL (ref 81.4–97.8)
MICRO URNS: NORMAL
MONOCYTES # BLD AUTO: 0.68 K/UL (ref 0–0.85)
MONOCYTES NFR BLD AUTO: 6.2 % (ref 0–13.4)
NEUTROPHILS # BLD AUTO: 7.18 K/UL (ref 2–7.15)
NEUTROPHILS NFR BLD: 64.9 % (ref 44–72)
NITRITE UR QL STRIP.AUTO: NEGATIVE
NRBC # BLD AUTO: 0 K/UL
NRBC BLD-RTO: 0 /100 WBC
NUMBER OF RH DOSES IND 8505RD: NORMAL
PH UR STRIP.AUTO: 6 [PH] (ref 5–8)
PLATELET # BLD AUTO: 278 K/UL (ref 164–446)
PMV BLD AUTO: 9.2 FL (ref 9–12.9)
POTASSIUM SERPL-SCNC: 3.8 MMOL/L (ref 3.6–5.5)
PROT SERPL-MCNC: 8 G/DL (ref 6–8.2)
PROT UR QL STRIP: NEGATIVE MG/DL
RBC # BLD AUTO: 4.33 M/UL (ref 4.2–5.4)
RBC UR QL AUTO: NEGATIVE
RH BLD: NORMAL
SODIUM SERPL-SCNC: 139 MMOL/L (ref 135–145)
SP GR UR STRIP.AUTO: 1.02
UROBILINOGEN UR STRIP.AUTO-MCNC: 0.2 MG/DL
WBC # BLD AUTO: 11 K/UL (ref 4.8–10.8)

## 2021-02-04 PROCEDURE — 81003 URINALYSIS AUTO W/O SCOPE: CPT

## 2021-02-04 PROCEDURE — 83690 ASSAY OF LIPASE: CPT

## 2021-02-04 PROCEDURE — 86901 BLOOD TYPING SEROLOGIC RH(D): CPT

## 2021-02-04 PROCEDURE — 85025 COMPLETE CBC W/AUTO DIFF WBC: CPT

## 2021-02-04 PROCEDURE — 84702 CHORIONIC GONADOTROPIN TEST: CPT

## 2021-02-04 PROCEDURE — 99284 EMERGENCY DEPT VISIT MOD MDM: CPT

## 2021-02-04 PROCEDURE — 80053 COMPREHEN METABOLIC PANEL: CPT

## 2021-02-04 PROCEDURE — 36415 COLL VENOUS BLD VENIPUNCTURE: CPT

## 2021-02-04 PROCEDURE — 76801 OB US < 14 WKS SINGLE FETUS: CPT

## 2021-02-04 ASSESSMENT — FIBROSIS 4 INDEX: FIB4 SCORE: 0.39

## 2021-02-05 NOTE — ED TRIAGE NOTES
"Chief Complaint   Patient presents with   • Abdominal Pain   • Pregnancy   • Vaginal Bleeding     24 yo female ambulatory to triage for above complaint. Pt reports lower abdominal cramping and small amounts of vaginal bleeding x 3D, + pregnancy test at home, possibly 4 wks.    Educated on triage process, encourage to inform staff of any changes.     /66   Pulse 96   Temp 36.7 °C (98 °F) (Temporal)   Resp 14   Ht 1.499 m (4' 11\")   Wt 60.2 kg (132 lb 11.5 oz)   SpO2 100%   BMI 26.81 kg/m²   "

## 2021-02-05 NOTE — ED NOTES
Discharge teaching and paperwork provided regarding subchorionic hematoma and all questions/concerns answered. VSS, OBGYN assessment stable. Patient discharged to the care of self/significant other and ambulated out of the ED.

## 2021-02-05 NOTE — DISCHARGE INSTRUCTIONS
Please call the pregnancy clinic listed above tomorrow morning to schedule a follow-up appointment for complete recheck.  Return to the emergency department if you develop any new or worsening symptoms including fevers, worsening pain, pain with urination, or if you have any further concerns.  Additionally, please return if you have worsening bleeding, and are soaking more than 2 pads per hour.

## 2021-02-05 NOTE — ED PROVIDER NOTES
ED Provider Note    Chief Complaint:   Pelvic cramping, vaginal spotting    HPI:  Kash Patel is a 25 y.o. female, -0-0-2 at 7 weeks 6 days estimated gestational age by first trimester ultrasound performed in the emergency department today (estimated date of delivery 2021), who presents for evaluation of pelvic cramping and vaginal spotting.  She reports that she had some mild bleeding and spotting yesterday, she reports no heavy bleeding or brisk vaginal bleeding.  She also reports some associated intermittent lower abdominal cramping.  She took a pregnancy test yesterday that was positive.  When her symptoms persisted she came to the emergency department today for further evaluation.  She reports no heavy bleeding.  She has not had any recent fevers, no dysuria, no urinary frequency.  Pain is localized to the low pelvis, and described as a sensation of cramping.  She reports 2 prior  sections, otherwise no previous pregnancy complications.  She reports that she has not yet seen a gynecologist for this pregnancy.  She denies any history of abnormal bleeding or bruising, no other abnormal bleeding or bruising noted.  She is not had any chest pain, no shortness of breath, no headaches, no recent fevers.  She is unable to identify any exacerbating or alleviating factors.    Review of Systems:  See HPI for pertinent positives and negatives. All other systems negative.    Past Medical History:       Social History:  Social History     Tobacco Use   • Smoking status: Never Smoker   • Smokeless tobacco: Never Used   Substance and Sexual Activity   • Alcohol use: No   • Drug use: No   • Sexual activity: Not on file       Surgical History:   has a past surgical history that includes primary c section.    Current Medications:  Home Medications     Reviewed by Andrea Clifford R.N. (Registered Nurse) on 21 at 1848  Med List Status: <None>   Medication Last Dose Status  "  promethazine (PHENERGAN) 25 MG Tab  Active                Allergies:  No Known Allergies    Physical Exam:  Vital Signs: /63   Pulse 71   Temp 36.7 °C (98 °F) (Temporal)   Resp 16   Ht 1.499 m (4' 11\")   Wt 60.2 kg (132 lb 11.5 oz)   SpO2 100%   BMI 26.81 kg/m²   Constitutional: Alert, no acute distress  HENT: Normocephalic, mask in place  Eyes: Pupils equal and reactive, normal conjunctiva  Neck: Supple, normal range of motion, no stridor  Cardiovascular: Extremities are warm and well perfused, no murmur appreciated, normal cardiac auscultation  Pulmonary: No respiratory distress, normal work of breathing, no accessory muscule usage, breath sounds clear and equal bilaterally, no wheezing, no coarse breath sounds  Abdomen: Soft, non-distended, non-tender to palpation, no peritoneal signs  Skin: Warm, dry, no rashes or lesions  Musculoskeletal: Normal range of motion in all extremities, no swelling or deformity noted  Neurologic: Alert, oriented, normal speech, normal motor function  Psychiatric: Normal and appropriate mood and affect    Medical records reviewed for continuity of care.  No recent visits for similar symptoms.    Labs:  Labs Reviewed   CBC WITH DIFFERENTIAL - Abnormal; Notable for the following components:       Result Value    WBC 11.0 (*)     MCHC 33.4 (*)     Neutrophils (Absolute) 7.18 (*)     All other components within normal limits   COMP METABOLIC PANEL - Abnormal; Notable for the following components:    Creatinine 0.48 (*)     AST(SGOT) 11 (*)     Globulin 3.7 (*)     All other components within normal limits   HCG QUANTITATIVE - Abnormal; Notable for the following components:    Bhcg 38668.0 (*)     All other components within normal limits   LIPASE   URINALYSIS,CULTURE IF INDICATED    Narrative:     Indication for culture:->Pregnant women: fever and/or  asymptomatic screening   ESTIMATED GFR   RH TYPE FOR RHOGAM FROM E.D.    Narrative:     Print Consent?->No "       Radiology:  US-OB 1ST TRIMESTER SINGLE GEST Is the patient pregnant? Yes   Final Result      Viable single intrauterine gestation of an estimated gestational age of seven weeks six days. A small subchorionic hemorrhage is present..           ED Medications Administered:  Medications - No data to display    Differential diagnosis:  Threatened miscarriage, ectopic pregnancy, normal pregnancy, subchorionic hemorrhage, incomplete miscarriage    MDM:  Patient presents for evaluation of pelvic cramping, and vaginal spotting in the setting of a positive pregnancy test.  On arrival to the emergency department her vital signs are within normal limits, her abdominal exam is benign.  She is not having any brisk vaginal bleeding.    On laboratory evaluation, quantitative hCG is 71,000.  CMP with no significant abnormalities, lipase is within normal limits.  White blood count is just above normal reference range at 11, hemoglobin is within normal limits at 13 which is consistent with her baseline though most recent values in our system are from 2019.  Urinalysis is negative for evidence of infection.  She is Rh+, no indication for RhoGam.    Pelvic ultrasound demonstrates single intrauterine gestation with estimated gestational age of 7 weeks 6 days.  Fetal heart rate is 170.  Small subchorionic hemorrhage is present.    She remains well-appearing throughout her stay in the emergency department, she had no worsening symptoms, no abnormal vital signs, no brisk vaginal bleeding.  I suspect that her vaginal spotting is due to the subchorionic hemorrhage.  Counseled her that these will often resolve and resulted in a normal healthy pregnancy, but that the subchorionic hemorrhage does increase her risk of miscarriage.  She is referred to [facility]'s center for prenatal care, counseled to call tomorrow morning to schedule a follow-up appointment.     service was used for the entirety of this encounter.    Personal  protective equipment including N95 surgical respirator, goggles, and gloves were used during this encounter.       Disposition:  Discharge home in stable condition    Final Impression:  1. Less than 8 weeks gestation of pregnancy    2. Subchorionic hemorrhage of placenta in first trimester, single or unspecified fetus        Electronically signed by: Lola Freire MD, 2/5/2021 1:11 AM

## 2021-03-09 ENCOUNTER — GYNECOLOGY VISIT (OUTPATIENT)
Dept: OBGYN | Facility: CLINIC | Age: 26
End: 2021-03-09

## 2021-03-09 DIAGNOSIS — O20.0 THREATENED ABORTION: ICD-10-CM

## 2021-03-09 PROCEDURE — 76830 TRANSVAGINAL US NON-OB: CPT | Performed by: OBSTETRICS & GYNECOLOGY

## 2021-03-09 PROCEDURE — 99203 OFFICE O/P NEW LOW 30 MIN: CPT | Mod: 25 | Performed by: OBSTETRICS & GYNECOLOGY

## 2021-03-09 NOTE — PROGRESS NOTES
DUB visit  LMP: ~ 12/16/2020   WT: 131 lb  BP: 108/60  Pt states she continues to have vaginal bleeding that comes and goes. States having nausea and dizziness.   Good # [phone]

## 2021-03-09 NOTE — PROGRESS NOTES
GYN Visit    Cc: ED follow-up, vaginal bleeding in pregnancy    HPI: 25 y.o.  here for f/u of visit in early February for vaginal bleeding and pregnancy.  Ultrasound done  revealed a 7-week viable intrauterine pregnancy with OKSANA by that scan of 21 (DD off by 7 days from her period dating).  Patient reports that since that time she has had continued bleeding on and off.  Reports some nausea.  Reports that the bleeding comes and goes sometimes is very light but occasionally having small clots.  No large clots or soaking through pads continuously.    Today would be 12 weeks 4 days by that 7-week ultrasound      Other concerns today.      ROS:  Gen: denies fevers, general concerns  Abd: denies abdominal pain  Gu: see HPI    Past Medical History:   Diagnosis Date   • Hypertension      PSHx: denies  GYNHx: denies abnl paps, denies STIs    BP: 108/60    Gen; AAO, NAD  Gu:  deferred    Transvaginal US performed and per my read:    Indication: vaginal bleeding in pregnancy  Findings:   [place] intrauterine pregnancy with + gestational sac, +yolk sac  CRL 6.54 cm (12w6d)  Positive fetal cardiac activity, 170s BPM  Right ovary visualized and normal. Left Ovary visualized and normal. Cervical length grossly normal   No free fluid in the cul-de-sac.    Impression: viable single intrauterine pregnancy @ 12w6d. EDC by US of 9/15/2021        A/P: 25 y.o.  with threatened , viable intrauterine pregnancy  Discussed bleeding precautions, can monitor if with light occasional vaginal bleeding but needs to be seen right away if soaking through more than 1 pad per hour.  OKSANA will be 2021  by 7-week ultrasound consistent with ultrasound today.    Rh Positive    B6\Unisom for nausea    CHTN, normotensive today; will need baseline preE labs/ASA therapy      F/U: NOB visit next available    Jessie Moore MD  [facility], Women's [facility]

## 2021-03-12 ENCOUNTER — APPOINTMENT (OUTPATIENT)
Dept: OBGYN | Facility: CLINIC | Age: 26
End: 2021-03-12
Payer: MEDICAID

## 2021-03-12 ENCOUNTER — HOSPITAL ENCOUNTER (OUTPATIENT)
Facility: MEDICAL CENTER | Age: 26
End: 2021-03-12
Attending: OBSTETRICS & GYNECOLOGY | Admitting: OBSTETRICS & GYNECOLOGY
Payer: MEDICAID

## 2021-03-23 ENCOUNTER — APPOINTMENT (OUTPATIENT)
Dept: OBGYN | Facility: CLINIC | Age: 26
End: 2021-03-23

## 2021-03-23 ENCOUNTER — INITIAL PRENATAL (OUTPATIENT)
Dept: OBGYN | Facility: CLINIC | Age: 26
End: 2021-03-23

## 2021-03-23 ENCOUNTER — HOSPITAL ENCOUNTER (OUTPATIENT)
Facility: MEDICAL CENTER | Age: 26
End: 2021-03-23
Attending: PHYSICIAN ASSISTANT
Payer: COMMERCIAL

## 2021-03-23 VITALS
BODY MASS INDEX: 25.79 KG/M2 | SYSTOLIC BLOOD PRESSURE: 104 MMHG | HEIGHT: 61 IN | WEIGHT: 136.6 LBS | DIASTOLIC BLOOD PRESSURE: 62 MMHG

## 2021-03-23 DIAGNOSIS — O09.92 SUPERVISION OF HIGH RISK PREGNANCY, ANTEPARTUM, SECOND TRIMESTER: ICD-10-CM

## 2021-03-23 DIAGNOSIS — O09.891 SUPERVISION OF OTHER HIGH RISK PREGNANCIES, FIRST TRIMESTER: ICD-10-CM

## 2021-03-23 DIAGNOSIS — Z98.891 HISTORY OF C-SECTION: ICD-10-CM

## 2021-03-23 LAB
APPEARANCE UR: NORMAL
BILIRUB UR STRIP-MCNC: NORMAL MG/DL
COLOR UR AUTO: NORMAL
GLUCOSE UR STRIP.AUTO-MCNC: NEGATIVE MG/DL
KETONES UR STRIP.AUTO-MCNC: NEGATIVE MG/DL
LEUKOCYTE ESTERASE UR QL STRIP.AUTO: NORMAL
NITRITE UR QL STRIP.AUTO: NEGATIVE
PH UR STRIP.AUTO: 7.5 [PH] (ref 5–8)
PROT UR QL STRIP: NEGATIVE MG/DL
RBC UR QL AUTO: NEGATIVE
SP GR UR STRIP.AUTO: 1.02
UROBILINOGEN UR STRIP-MCNC: NORMAL MG/DL

## 2021-03-23 PROCEDURE — 81002 URINALYSIS NONAUTO W/O SCOPE: CPT | Performed by: PHYSICIAN ASSISTANT

## 2021-03-23 PROCEDURE — 59402 PR NEW OB HIGH RISK: CPT | Performed by: PHYSICIAN ASSISTANT

## 2021-03-23 ASSESSMENT — ENCOUNTER SYMPTOMS
CONSTITUTIONAL NEGATIVE: 1
NEUROLOGICAL NEGATIVE: 1
EYES NEGATIVE: 1
MUSCULOSKELETAL NEGATIVE: 1
GASTROINTESTINAL NEGATIVE: 1
CARDIOVASCULAR NEGATIVE: 1
PSYCHIATRIC NEGATIVE: 1
RESPIRATORY NEGATIVE: 1

## 2021-03-23 ASSESSMENT — FIBROSIS 4 INDEX: FIB4 SCORE: 0.32

## 2021-03-23 NOTE — PROGRESS NOTES
"Subjective:      Kash Patel is a 25 y.o. female who presents with New ob visit. Pt sure of LMP then had US in ER at 7wk then  at 12 wk - per MD, will keep OKSANA as 9/17 per 7 wk US. Pt has hx of SAB with D&C, then primary C/S at term for FTP, then repeat at full term, though baby only weighed 4lb. Pt denies GDM though thinks she had some mildly elevated BPs at end of 2nd pregnancy only. Denies anesthetic or surgical  complications. Pt denies PMhx, other Shx. NKDA. Taking PNV only. Denies tob, etoh or drug use. Pt currently denies cramping, bleeding or pain though pt was in ER for bleeding and cramping, though denies currently. No FM.           HPI    Review of Systems   Constitutional: Negative.    HENT: Negative.    Eyes: Negative.    Respiratory: Negative.    Cardiovascular: Negative.    Gastrointestinal: Negative.    Genitourinary: Negative.    Musculoskeletal: Negative.    Skin: Negative.    Neurological: Negative.    Endo/Heme/Allergies: Negative.    Psychiatric/Behavioral: Negative.    All other systems reviewed and are negative.         Objective:     /62   Ht 1.549 m (5' 1\")   Wt 62 kg (136 lb 9.6 oz)   LMP 12/16/2020   BMI 25.81 kg/m²      Physical Exam  Vitals reviewed.   Constitutional:       Appearance: She is well-developed.   HENT:      Head: Normocephalic and atraumatic.   Eyes:      Pupils: Pupils are equal, round, and reactive to light.   Neck:      Thyroid: No thyromegaly.   Cardiovascular:      Rate and Rhythm: Normal rate and regular rhythm.      Heart sounds: Normal heart sounds.   Pulmonary:      Effort: Pulmonary effort is normal. No respiratory distress.      Breath sounds: Normal breath sounds.   Abdominal:      General: Bowel sounds are normal. There is no distension.      Palpations: Abdomen is soft.      Tenderness: There is no abdominal tenderness.          Comments: C/S scar   Genitourinary:     Exam position: Supine.      Labia:         Right: No rash or " tenderness.         Left: No rash or tenderness.       Vagina: Normal. No signs of injury and foreign body. No vaginal discharge or erythema.      Cervix: No cervical motion tenderness.      Uterus: Enlarged (Gravid, uterus c/w 14-15wk size). Not deviated and not tender.       Adnexa:         Right: No mass or tenderness.          Left: No mass or tenderness.     Musculoskeletal:      Cervical back: Normal range of motion and neck supple.   Skin:     General: Skin is warm and dry.      Findings: No erythema.   Neurological:      Mental Status: She is alert.      Deep Tendon Reflexes: Reflexes are normal and symmetric.   Psychiatric:         Behavior: Behavior normal.         Thought Content: Thought content normal.       Wet mt: Neg          Assessment/Plan:        1. Supervision of other high risk pregnancies, first trimester  - POCT Urinalysis    2. Supervision of high risk pregnancy, antepartum, second trimester  - F/u 4 wk, US 4-6 wk  - SAB precautions stressed today  - PREG CNTR PRENATAL PN; Future  - US-OB 2ND 3RD TRI COMPLETE; Future  - URINE DRUG SCREEN W/CONF (AR); Future  - HEP C VIRUS ANTIBODY; Future  - AFP TETRA; Future  - Influenza Vaccine Quad Injection (PF)  - Chlamydia/GC PCR Urine Or Swab; Future  - Per pt, PAP wnl 2019 [facility], so will request records today    3. History of C/S x 2 - needs repeat  - Pt states she wants more children after this, so declines BTL

## 2021-03-23 NOTE — PROGRESS NOTES
Pt. Here for NOB visit.  # [phone]  Pt had a DUB visit on 3/9/2021  Pt was seen at [facility] ER on 2/4/2021 for pelvic cramping and vaginal bleeding U/S was done.   Pt. States having some dizziness and discharge with odor and some spotting.   Pharmacy verified.   Chaperone offered and provided.   Last pap smear done in 2019 WNL records requested  AFP lab slip given today along with instructions.   FLU vaccine offered not sure due to cost.

## 2021-03-24 LAB
C TRACH DNA SPEC QL NAA+PROBE: NEGATIVE
N GONORRHOEA DNA SPEC QL NAA+PROBE: NEGATIVE
SPECIMEN SOURCE: NORMAL

## 2021-03-26 ENCOUNTER — HOSPITAL ENCOUNTER (OUTPATIENT)
Facility: MEDICAL CENTER | Age: 26
End: 2021-03-27
Attending: EMERGENCY MEDICINE | Admitting: STUDENT IN AN ORGANIZED HEALTH CARE EDUCATION/TRAINING PROGRAM
Payer: MEDICAID

## 2021-03-26 ENCOUNTER — APPOINTMENT (OUTPATIENT)
Dept: RADIOLOGY | Facility: MEDICAL CENTER | Age: 26
End: 2021-03-26
Attending: STUDENT IN AN ORGANIZED HEALTH CARE EDUCATION/TRAINING PROGRAM
Payer: MEDICAID

## 2021-03-26 ENCOUNTER — APPOINTMENT (OUTPATIENT)
Dept: RADIOLOGY | Facility: MEDICAL CENTER | Age: 26
End: 2021-03-26
Attending: EMERGENCY MEDICINE
Payer: MEDICAID

## 2021-03-26 DIAGNOSIS — G44.201 ACUTE INTRACTABLE TENSION-TYPE HEADACHE: ICD-10-CM

## 2021-03-26 PROBLEM — D72.829 LEUKOCYTOSIS: Status: ACTIVE | Noted: 2021-03-26

## 2021-03-26 PROBLEM — R51.9 INTRACTABLE HEADACHE: Status: ACTIVE | Noted: 2021-03-26

## 2021-03-26 PROBLEM — Z3A.14 14 WEEKS GESTATION OF PREGNANCY: Status: ACTIVE | Noted: 2021-03-26

## 2021-03-26 LAB
ALBUMIN SERPL BCP-MCNC: 3.8 G/DL (ref 3.2–4.9)
ALBUMIN/GLOB SERPL: 1 G/DL
ALP SERPL-CCNC: 66 U/L (ref 30–99)
ALT SERPL-CCNC: 8 U/L (ref 2–50)
ANION GAP SERPL CALC-SCNC: 10 MMOL/L (ref 7–16)
APPEARANCE UR: CLEAR
AST SERPL-CCNC: 11 U/L (ref 12–45)
BACTERIA #/AREA URNS HPF: ABNORMAL /HPF
BASOPHILS # BLD AUTO: 0.2 % (ref 0–1.8)
BASOPHILS # BLD: 0.03 K/UL (ref 0–0.12)
BILIRUB SERPL-MCNC: 0.2 MG/DL (ref 0.1–1.5)
BILIRUB UR QL STRIP.AUTO: NEGATIVE
BUN SERPL-MCNC: 8 MG/DL (ref 8–22)
CALCIUM SERPL-MCNC: 9.3 MG/DL (ref 8.5–10.5)
CHLORIDE SERPL-SCNC: 101 MMOL/L (ref 96–112)
CO2 SERPL-SCNC: 21 MMOL/L (ref 20–33)
COLOR UR: YELLOW
CREAT SERPL-MCNC: 0.4 MG/DL (ref 0.5–1.4)
EKG IMPRESSION: NORMAL
EOSINOPHIL # BLD AUTO: 0.09 K/UL (ref 0–0.51)
EOSINOPHIL NFR BLD: 0.7 % (ref 0–6.9)
EPI CELLS #/AREA URNS HPF: ABNORMAL /HPF
ERYTHROCYTE [DISTWIDTH] IN BLOOD BY AUTOMATED COUNT: 39.4 FL (ref 35.9–50)
FLUAV RNA SPEC QL NAA+PROBE: NEGATIVE
FLUBV RNA SPEC QL NAA+PROBE: NEGATIVE
GLOBULIN SER CALC-MCNC: 3.7 G/DL (ref 1.9–3.5)
GLUCOSE SERPL-MCNC: 100 MG/DL (ref 65–99)
GLUCOSE UR STRIP.AUTO-MCNC: NEGATIVE MG/DL
HCT VFR BLD AUTO: 35.6 % (ref 37–47)
HGB BLD-MCNC: 12.3 G/DL (ref 12–16)
HYALINE CASTS #/AREA URNS LPF: ABNORMAL /LPF
IMM GRANULOCYTES # BLD AUTO: 0.06 K/UL (ref 0–0.11)
IMM GRANULOCYTES NFR BLD AUTO: 0.5 % (ref 0–0.9)
KETONES UR STRIP.AUTO-MCNC: 15 MG/DL
LACTATE BLD-SCNC: 0.9 MMOL/L (ref 0.5–2)
LEUKOCYTE ESTERASE UR QL STRIP.AUTO: ABNORMAL
LYMPHOCYTES # BLD AUTO: 2.12 K/UL (ref 1–4.8)
LYMPHOCYTES NFR BLD: 17.1 % (ref 22–41)
MCH RBC QN AUTO: 30.2 PG (ref 27–33)
MCHC RBC AUTO-ENTMCNC: 34.6 G/DL (ref 33.6–35)
MCV RBC AUTO: 87.5 FL (ref 81.4–97.8)
MICRO URNS: ABNORMAL
MONOCYTES # BLD AUTO: 0.59 K/UL (ref 0–0.85)
MONOCYTES NFR BLD AUTO: 4.8 % (ref 0–13.4)
NEUTROPHILS # BLD AUTO: 9.53 K/UL (ref 2–7.15)
NEUTROPHILS NFR BLD: 76.7 % (ref 44–72)
NITRITE UR QL STRIP.AUTO: NEGATIVE
NRBC # BLD AUTO: 0 K/UL
NRBC BLD-RTO: 0 /100 WBC
NUMBER OF RH DOSES IND 8505RD: NORMAL
PH UR STRIP.AUTO: 6.5 [PH] (ref 5–8)
PLATELET # BLD AUTO: 243 K/UL (ref 164–446)
PMV BLD AUTO: 9.3 FL (ref 9–12.9)
POTASSIUM SERPL-SCNC: 3.8 MMOL/L (ref 3.6–5.5)
PROT SERPL-MCNC: 7.5 G/DL (ref 6–8.2)
PROT UR QL STRIP: NEGATIVE MG/DL
RBC # BLD AUTO: 4.07 M/UL (ref 4.2–5.4)
RBC # URNS HPF: ABNORMAL /HPF
RBC UR QL AUTO: ABNORMAL
RH BLD: NORMAL
RSV RNA SPEC QL NAA+PROBE: NEGATIVE
SARS-COV-2 RNA RESP QL NAA+PROBE: NOTDETECTED
SODIUM SERPL-SCNC: 132 MMOL/L (ref 135–145)
SP GR UR STRIP.AUTO: 1.01
SPECIMEN SOURCE: NORMAL
UROBILINOGEN UR STRIP.AUTO-MCNC: 0.2 MG/DL
WBC # BLD AUTO: 12.4 K/UL (ref 4.8–10.8)
WBC #/AREA URNS HPF: ABNORMAL /HPF

## 2021-03-26 PROCEDURE — 96365 THER/PROPH/DIAG IV INF INIT: CPT

## 2021-03-26 PROCEDURE — 86901 BLOOD TYPING SEROLOGIC RH(D): CPT

## 2021-03-26 PROCEDURE — 80053 COMPREHEN METABOLIC PANEL: CPT

## 2021-03-26 PROCEDURE — 76815 OB US LIMITED FETUS(S): CPT

## 2021-03-26 PROCEDURE — 700111 HCHG RX REV CODE 636 W/ 250 OVERRIDE (IP): Performed by: EMERGENCY MEDICINE

## 2021-03-26 PROCEDURE — C9803 HOPD COVID-19 SPEC COLLECT: HCPCS | Performed by: EMERGENCY MEDICINE

## 2021-03-26 PROCEDURE — G0378 HOSPITAL OBSERVATION PER HR: HCPCS

## 2021-03-26 PROCEDURE — 93010 ELECTROCARDIOGRAM REPORT: CPT | Performed by: INTERNAL MEDICINE

## 2021-03-26 PROCEDURE — 87086 URINE CULTURE/COLONY COUNT: CPT

## 2021-03-26 PROCEDURE — 99285 EMERGENCY DEPT VISIT HI MDM: CPT

## 2021-03-26 PROCEDURE — 0241U HCHG SARS-COV-2 COVID-19 NFCT DS RESP RNA 4 TRGT MIC: CPT

## 2021-03-26 PROCEDURE — 81001 URINALYSIS AUTO W/SCOPE: CPT

## 2021-03-26 PROCEDURE — 700111 HCHG RX REV CODE 636 W/ 250 OVERRIDE (IP): Performed by: STUDENT IN AN ORGANIZED HEALTH CARE EDUCATION/TRAINING PROGRAM

## 2021-03-26 PROCEDURE — 70544 MR ANGIOGRAPHY HEAD W/O DYE: CPT

## 2021-03-26 PROCEDURE — 83605 ASSAY OF LACTIC ACID: CPT

## 2021-03-26 PROCEDURE — 96375 TX/PRO/DX INJ NEW DRUG ADDON: CPT

## 2021-03-26 PROCEDURE — 96374 THER/PROPH/DIAG INJ IV PUSH: CPT

## 2021-03-26 PROCEDURE — 700105 HCHG RX REV CODE 258: Performed by: EMERGENCY MEDICINE

## 2021-03-26 PROCEDURE — 96366 THER/PROPH/DIAG IV INF ADDON: CPT

## 2021-03-26 PROCEDURE — 93005 ELECTROCARDIOGRAM TRACING: CPT | Performed by: EMERGENCY MEDICINE

## 2021-03-26 PROCEDURE — A9270 NON-COVERED ITEM OR SERVICE: HCPCS | Performed by: STUDENT IN AN ORGANIZED HEALTH CARE EDUCATION/TRAINING PROGRAM

## 2021-03-26 PROCEDURE — 99220 PR INITIAL OBSERVATION CARE,LEVL III: CPT | Performed by: STUDENT IN AN ORGANIZED HEALTH CARE EDUCATION/TRAINING PROGRAM

## 2021-03-26 PROCEDURE — 85025 COMPLETE CBC W/AUTO DIFF WBC: CPT

## 2021-03-26 PROCEDURE — 700102 HCHG RX REV CODE 250 W/ 637 OVERRIDE(OP): Performed by: STUDENT IN AN ORGANIZED HEALTH CARE EDUCATION/TRAINING PROGRAM

## 2021-03-26 PROCEDURE — 700105 HCHG RX REV CODE 258: Performed by: STUDENT IN AN ORGANIZED HEALTH CARE EDUCATION/TRAINING PROGRAM

## 2021-03-26 RX ORDER — ACETAMINOPHEN 325 MG/1
650 TABLET ORAL EVERY 6 HOURS PRN
Status: DISCONTINUED | OUTPATIENT
Start: 2021-03-26 | End: 2021-03-27 | Stop reason: HOSPADM

## 2021-03-26 RX ORDER — ACETAMINOPHEN 500 MG
500 TABLET ORAL 3 TIMES DAILY PRN
Status: ON HOLD | COMMUNITY
End: 2021-04-15 | Stop reason: SDUPTHER

## 2021-03-26 RX ORDER — PROCHLORPERAZINE EDISYLATE 5 MG/ML
10 INJECTION INTRAMUSCULAR; INTRAVENOUS ONCE
Status: COMPLETED | OUTPATIENT
Start: 2021-03-26 | End: 2021-03-26

## 2021-03-26 RX ORDER — SODIUM CHLORIDE 9 MG/ML
INJECTION, SOLUTION INTRAVENOUS CONTINUOUS
Status: DISCONTINUED | OUTPATIENT
Start: 2021-03-26 | End: 2021-03-27 | Stop reason: HOSPADM

## 2021-03-26 RX ORDER — ONDANSETRON 2 MG/ML
4 INJECTION INTRAMUSCULAR; INTRAVENOUS EVERY 4 HOURS PRN
Status: DISCONTINUED | OUTPATIENT
Start: 2021-03-26 | End: 2021-03-27 | Stop reason: HOSPADM

## 2021-03-26 RX ORDER — SODIUM CHLORIDE 9 MG/ML
1000 INJECTION, SOLUTION INTRAVENOUS ONCE
Status: COMPLETED | OUTPATIENT
Start: 2021-03-26 | End: 2021-03-26

## 2021-03-26 RX ORDER — LORAZEPAM 2 MG/ML
0.5 INJECTION INTRAMUSCULAR ONCE
Status: COMPLETED | OUTPATIENT
Start: 2021-03-26 | End: 2021-03-26

## 2021-03-26 RX ORDER — MAGNESIUM SULFATE HEPTAHYDRATE 40 MG/ML
2 INJECTION, SOLUTION INTRAVENOUS ONCE
Status: COMPLETED | OUTPATIENT
Start: 2021-03-26 | End: 2021-03-26

## 2021-03-26 RX ORDER — MORPHINE SULFATE 4 MG/ML
4 INJECTION, SOLUTION INTRAMUSCULAR; INTRAVENOUS EVERY 4 HOURS PRN
Status: DISCONTINUED | OUTPATIENT
Start: 2021-03-26 | End: 2021-03-27 | Stop reason: HOSPADM

## 2021-03-26 RX ORDER — VITAMIN A ACETATE, BETA CAROTENE, ASCORBIC ACID, CHOLECALCIFEROL, .ALPHA.-TOCOPHEROL ACETATE, DL-, THIAMINE MONONITRATE, RIBOFLAVIN, NIACINAMIDE, PYRIDOXINE HYDROCHLORIDE, FOLIC ACID, CYANOCOBALAMIN, CALCIUM CARBONATE, FERROUS FUMARATE, ZINC OXIDE, CUPRIC OXIDE 3080; 12; 120; 400; 1; 1.84; 3; 20; 22; 920; 25; 200; 27; 10; 2 [IU]/1; UG/1; MG/1; [IU]/1; MG/1; MG/1; MG/1; MG/1; MG/1; [IU]/1; MG/1; MG/1; MG/1; MG/1; MG/1
1 TABLET, FILM COATED ORAL
Status: DISCONTINUED | OUTPATIENT
Start: 2021-03-26 | End: 2021-03-27 | Stop reason: HOSPADM

## 2021-03-26 RX ORDER — DIPHENHYDRAMINE HYDROCHLORIDE 50 MG/ML
50 INJECTION INTRAMUSCULAR; INTRAVENOUS ONCE
Status: COMPLETED | OUTPATIENT
Start: 2021-03-26 | End: 2021-03-26

## 2021-03-26 RX ADMIN — SODIUM CHLORIDE: 9 INJECTION, SOLUTION INTRAVENOUS at 13:42

## 2021-03-26 RX ADMIN — DIPHENHYDRAMINE HYDROCHLORIDE 50 MG: 50 INJECTION INTRAMUSCULAR; INTRAVENOUS at 04:06

## 2021-03-26 RX ADMIN — PRENATAL WITH FERROUS FUM AND FOLIC ACID 1 TABLET: 3080; 920; 120; 400; 22; 1.84; 3; 20; 10; 1; 12; 200; 27; 25; 2 TABLET ORAL at 10:09

## 2021-03-26 RX ADMIN — ACETAMINOPHEN 650 MG: 325 TABLET ORAL at 14:01

## 2021-03-26 RX ADMIN — SODIUM CHLORIDE 1000 ML: 9 INJECTION, SOLUTION INTRAVENOUS at 04:09

## 2021-03-26 RX ADMIN — PROCHLORPERAZINE EDISYLATE 10 MG: 5 INJECTION INTRAMUSCULAR; INTRAVENOUS at 04:06

## 2021-03-26 RX ADMIN — MAGNESIUM SULFATE 2 G: 2 INJECTION INTRAVENOUS at 15:48

## 2021-03-26 RX ADMIN — LORAZEPAM 0.5 MG: 2 INJECTION INTRAMUSCULAR; INTRAVENOUS at 04:52

## 2021-03-26 ASSESSMENT — ENCOUNTER SYMPTOMS
PHOTOPHOBIA: 1
PALPITATIONS: 0
HEADACHES: 1
LOSS OF CONSCIOUSNESS: 0
SEIZURES: 0
CHILLS: 0
NAUSEA: 1
CONSTIPATION: 0
DIARRHEA: 0
SHORTNESS OF BREATH: 0
SPUTUM PRODUCTION: 0
BLURRED VISION: 1
MYALGIAS: 0
VOMITING: 1
FEVER: 0
ABDOMINAL PAIN: 0
FOCAL WEAKNESS: 0
FALLS: 0
HEARTBURN: 0
NERVOUS/ANXIOUS: 0

## 2021-03-26 ASSESSMENT — LIFESTYLE VARIABLES
DOES PATIENT WANT TO STOP DRINKING: NO
TOTAL SCORE: 0
HAVE PEOPLE ANNOYED YOU BY CRITICIZING YOUR DRINKING: NO
EVER HAD A DRINK FIRST THING IN THE MORNING TO STEADY YOUR NERVES TO GET RID OF A HANGOVER: NO
ALCOHOL_USE: NO
EVER FELT BAD OR GUILTY ABOUT YOUR DRINKING: NO
TOTAL SCORE: 0
SUBSTANCE_ABUSE: 0
CONSUMPTION TOTAL: NEGATIVE
AVERAGE NUMBER OF DAYS PER WEEK YOU HAVE A DRINK CONTAINING ALCOHOL: 0
TOTAL SCORE: 0
HAVE YOU EVER FELT YOU SHOULD CUT DOWN ON YOUR DRINKING: NO
ON A TYPICAL DAY WHEN YOU DRINK ALCOHOL HOW MANY DRINKS DO YOU HAVE: 0
HOW MANY TIMES IN THE PAST YEAR HAVE YOU HAD 5 OR MORE DRINKS IN A DAY: 0

## 2021-03-26 ASSESSMENT — PATIENT HEALTH QUESTIONNAIRE - PHQ9
7. TROUBLE CONCENTRATING ON THINGS, SUCH AS READING THE NEWSPAPER OR WATCHING TELEVISION: NOT AT ALL
8. MOVING OR SPEAKING SO SLOWLY THAT OTHER PEOPLE COULD HAVE NOTICED. OR THE OPPOSITE, BEING SO FIGETY OR RESTLESS THAT YOU HAVE BEEN MOVING AROUND A LOT MORE THAN USUAL: SEVERAL DAYS
9. THOUGHTS THAT YOU WOULD BE BETTER OFF DEAD, OR OF HURTING YOURSELF: NOT AT ALL
4. FEELING TIRED OR HAVING LITTLE ENERGY: SEVERAL DAYS
6. FEELING BAD ABOUT YOURSELF - OR THAT YOU ARE A FAILURE OR HAVE LET YOURSELF OR YOUR FAMILY DOWN: NOT AL ALL
SUM OF ALL RESPONSES TO PHQ9 QUESTIONS 1 AND 2: 3
SUM OF ALL RESPONSES TO PHQ QUESTIONS 1-9: 7
2. FEELING DOWN, DEPRESSED, IRRITABLE, OR HOPELESS: NEARLY EVERY DAY
3. TROUBLE FALLING OR STAYING ASLEEP OR SLEEPING TOO MUCH: SEVERAL DAYS
1. LITTLE INTEREST OR PLEASURE IN DOING THINGS: NOT AT ALL
5. POOR APPETITE OR OVEREATING: SEVERAL DAYS

## 2021-03-26 ASSESSMENT — PAIN DESCRIPTION - PAIN TYPE
TYPE: ACUTE PAIN
TYPE: ACUTE PAIN

## 2021-03-26 ASSESSMENT — FIBROSIS 4 INDEX
FIB4 SCORE: 0.32
FIB4 SCORE: .432120810725112376

## 2021-03-26 NOTE — ED NOTES
Noc RN attempted to give report prior to end of his shift x2. Nurse was unavailable. This RN attempted to give report, nurse didn't answer.  Pt went to floor in stable condition.

## 2021-03-26 NOTE — H&P
Hospital Medicine History & Physical Note    Date of Service  3/26/2021    Primary Care Physician  No primary care provider on file.    Consultants  None    Code Status  Full Code    Chief Complaint  Chief Complaint   Patient presents with   • Headache     Pt reports HA 10/10 that started this AM, some blurry vision and spots in vision when standing up. Pt took 1500mg tylenol today. 14.5 weeks pregnant.        History of Presenting Illness  25 y.o. female 14 weeks and 6 days gestation who presented 3/26/2021 with complaint of severe headache getting worse since waking at 9 AM.    Ms.Vargas Patel awoke on the morning of admission at 9 AM and noted a mild headache.  Her headache was progressive not relieved by Tylenol.  She did have associated photophobia phonophobia, and nausea all day and one episode of nonbloody nonbilious emesis.  Additionally she was also having a little blurry vision.  Denies any weakness or speech injuries, chest pain, productive cough, dyspnea, nausea vomiting abdominal pain or diarrhea, no dysuria or diarrhea.  She does report vaginal bleeding that has been present since the beginning of her pregnancy.  She specifically denies any neck pain or stiffness, denies any fevers or chills.    Arrival patient blood pressure is 102/66, heart rate 107 respiratory rate 14 oxygen saturation 98% on room air, the patient was afebrile.  Initial work-up showed mild leukocytosis 12.4, sodium 132 otherwise normal electrolytes renal function and liver function, lactic acid 0.9.  Patient was clinically dehydrated on arrival, received Benadryl, Compazine, IV fluids.  She did feel uncomfortable following the Compazine was given a dose of Ativan with improvement.  Due to the ongoing nature of the headache I agree it is reasonable to obtain an MRV to rule out dural venous sinus thrombosis.  Subsequently admitted under hospitalist service.    Review of Systems  Review of Systems   Constitutional: Negative for  chills and fever.   HENT: Negative for congestion and nosebleeds.    Eyes: Positive for blurred vision and photophobia.   Respiratory: Negative for sputum production and shortness of breath.    Cardiovascular: Negative for chest pain and palpitations.   Gastrointestinal: Positive for nausea and vomiting. Negative for abdominal pain, constipation, diarrhea and heartburn.   Genitourinary: Negative for dysuria, frequency and urgency.   Musculoskeletal: Negative for falls and myalgias.   Neurological: Positive for headaches. Negative for focal weakness, seizures and loss of consciousness.   Psychiatric/Behavioral: Negative for substance abuse. The patient is not nervous/anxious.        Past Medical History   has a past medical history of Hypertension.    Surgical History   has a past surgical history that includes primary c section (06/19/2015); repeat c section (10/08/2018); and dilation and curettage (2010).     Family History  family history includes Alcohol abuse in her maternal grandfather; Cancer in her maternal grandmother; Diabetes in her father and paternal grandmother; Hyperlipidemia in her paternal grandfather; No Known Problems in her brother, mother, and sister.     Social History   reports that she has never smoked. She has never used smokeless tobacco. She reports previous alcohol use. She reports previous drug use. Drugs: Marijuana and Inhaled.    Allergies  No Known Allergies    Medications  Prior to Admission Medications   Prescriptions Last Dose Informant Patient Reported? Taking?   Prenatal MV-Min-Fe Fum-FA-DHA (PRENATAL 1 PO) 3/25/2021 at [facility] Patient Yes No   Sig: Take 1 tablet by mouth every day.   acetaminophen (TYLENOL) 500 MG Tab 3/25/2021 at 2200 Patient Yes Yes   Sig: Take 500 mg by mouth 3 times a day. Headache   promethazine (PHENERGAN) 25 MG Tab Not Taking at Not Taking Patient No No   Sig: Take 1 Tab by mouth every 6 hours as needed for Nausea/Vomiting.   Patient not taking: Reported on  3/26/2021      Facility-Administered Medications: None       Physical Exam  Temp:  [37.3 °C (99.1 °F)] 37.3 °C (99.1 °F)  Pulse:  [] 90  Resp:  [14] 14  BP: ()/(51-66) 103/57  SpO2:  [98 %-100 %] 100 %    Physical Exam  Vitals and nursing note reviewed. Exam conducted with a chaperone present.   Constitutional:       General: She is not in acute distress.     Appearance: She is not toxic-appearing.      Comments: 25-year-old female appears stated age, patient is drowsy following medication administration, she is conversant and able to answer all questions appropriately   HENT:      Head: Normocephalic and atraumatic.      Nose: Nose normal. No rhinorrhea.      Mouth/Throat:      Mouth: Mucous membranes are dry.      Pharynx: Oropharynx is clear. No oropharyngeal exudate or posterior oropharyngeal erythema.   Eyes:      General: No scleral icterus.     Extraocular Movements: Extraocular movements intact.      Conjunctiva/sclera: Conjunctivae normal.      Pupils: Pupils are equal, round, and reactive to light.   Cardiovascular:      Rate and Rhythm: Normal rate and regular rhythm.      Pulses: Normal pulses.      Heart sounds: Murmur present.   Pulmonary:      Effort: Pulmonary effort is normal. No respiratory distress.      Breath sounds: Normal breath sounds. No wheezing.   Abdominal:      General: Bowel sounds are normal.      Palpations: Abdomen is soft.      Tenderness: There is no abdominal tenderness. There is no guarding.   Musculoskeletal:         General: No swelling or tenderness. Normal range of motion.      Cervical back: Normal range of motion and neck supple. No rigidity.   Skin:     General: Skin is warm and dry.      Capillary Refill: Capillary refill takes less than 2 seconds.   Neurological:      General: No focal deficit present.      Mental Status: She is alert and oriented to person, place, and time. Mental status is at baseline.      Cranial Nerves: No cranial nerve deficit.       Sensory: No sensory deficit.      Motor: No weakness.      Comments: Blurry vision has resolved   Psychiatric:         Mood and Affect: Mood normal.         Behavior: Behavior normal.         Thought Content: Thought content normal.         Judgment: Judgment normal.         Laboratory:  Recent Labs     03/26/21 0317   WBC 12.4*   RBC 4.07*   HEMOGLOBIN 12.3   HEMATOCRIT 35.6*   MCV 87.5   MCH 30.2   MCHC 34.6   RDW 39.4   PLATELETCT 243   MPV 9.3     Recent Labs     03/26/21 0317   SODIUM 132*   POTASSIUM 3.8   CHLORIDE 101   CO2 21   GLUCOSE 100*   BUN 8   CREATININE 0.40*   CALCIUM 9.3     Recent Labs     03/26/21 0317   ALTSGPT 8   ASTSGOT 11*   ALKPHOSPHAT 66   TBILIRUBIN 0.2   GLUCOSE 100*         No results for input(s): NTPROBNP in the last 72 hours.      No results for input(s): TROPONINT in the last 72 hours.    Imaging:  US-OB LIMITED TRANSABDOMINAL   Final Result      Single intrauterine pregnancy of an estimated gestational age of 14 weeks 6 days with an estimated date of delivery of September 18, 2021.      MR-VENOGRAM (MRV) HEAD    (Results Pending)         Assessment/Plan:  I anticipate this patient is appropriate for observation status at this time.    * Intractable headache- (present on admission)  Assessment & Plan  -Patient is pregnant and presented with acute severe headache, no history of headache syndromes.  -Headache persisted and improved only minimally despite pain medications, will order MRV to rule out dural venous sinus thrombosis    14 weeks gestation of pregnancy  Assessment & Plan  Patient is 14 weeks and 6 days pregnant.    Leukocytosis- (present on admission)  Assessment & Plan  -Mild leukocytosis 12.4, no fevers.  -Patient is complaining of headache but otherwise has no meningeal signs or symptoms, neck is supple with full range of motion, no rigidity.  No other obvious signs of infection, I am suspecting leukocytosis is reactive.  -Urinalysis pending

## 2021-03-26 NOTE — ED NOTES
BREAK RN NOTE**  Pt to the floor via gurney with transport accompanied by family member.  All belongings with patient on transfer.

## 2021-03-26 NOTE — ASSESSMENT & PLAN NOTE
-Mild leukocytosis 12.4, no fevers.  -Patient is complaining of headache but otherwise has no meningeal signs or symptoms, neck is supple with full range of motion, no rigidity.  No other obvious signs of infection, I am suspecting leukocytosis is reactive.  -Urinalysis pending

## 2021-03-26 NOTE — ED PROVIDER NOTES
ED Provider Note    CHIEF COMPLAINT  Chief Complaint   Patient presents with   • Headache     Pt reports HA 10/10 that started this AM, some blurry vision and spots in vision when standing up. Pt took 1500mg tylenol today. 14.5 weeks pregnant.        HPI  Kwakuoscar Senait Patel is a 25 y.o. female at approximately 14 weeks gestation who presents with a chief complaint of headache that started yesterday morning after waking at 9:00 AM.  Initially the headache was mild but over the course of the day it became worse.  She has been taking Tylenol without improvement.  She endorses associated photophobia and phonophobia and has been nauseated all day.  She did have one episode of nonbloody, nonbilious emesis just prior to arrival to the ER.  She endorses some blurred vision but denies any weakness or numbness in her extremities.  No difficulty speaking or swallowing. She states she has felt warm all day but did not take her temperature. She does not have neck pain or stiffness. She has an occasional dry cough but no chest pain, shortness of breath, abdominal pain, or dysuria. She has been having vaginal bleeding throughout her pregnancy. She is [facility] with the women[facility] center for this pregnancy.    REVIEW OF SYSTEMS  See HPI for further details. Headache. Photophobia. Phonophobia. Tactile fever. Blurry vision. Vaginal bleeding in pregnancy. All other systems are negative.     PAST MEDICAL HISTORY   has a past medical history of Hypertension.    SOCIAL HISTORY  Social History     Tobacco Use   • Smoking status: Never Smoker   • Smokeless tobacco: Never Used   Substance and Sexual Activity   • Alcohol use: Not Currently   • Drug use: Not Currently     Types: Marijuana, Inhaled   • Sexual activity: Yes     Partners: Male     Birth control/protection: None     Comment: Unplanned pregnancy       SURGICAL HISTORY   has a past surgical history that includes primary c section (06/19/2015); repeat c section  "(10/08/2018); and dilation and curettage (2010).    CURRENT MEDICATIONS  Home Medications     Reviewed by Juliane Shipman R.N. (Registered Nurse) on 03/26/21 at 0047  Med List Status: Complete   Medication Last Dose Status   Prenatal MV-Min-Fe Fum-FA-DHA (PRENATAL 1 PO)  Active   promethazine (PHENERGAN) 25 MG Tab  Active                ALLERGIES  No Known Allergies    PHYSICAL EXAM  VITAL SIGNS: /57   Pulse 90   Temp 37.3 °C (99.1 °F) (Temporal)   Resp 14   Ht 1.5 m (4' 11.06\")   Wt 59.6 kg (131 lb 6.3 oz)   LMP 12/16/2020   SpO2 100%   BMI 26.49 kg/m²    Pulse ox interpretation: I interpret this pulse ox as normal.  Constitutional: Alert in no apparent distress.  HENT: No signs of trauma, Bilateral external ears normal, Nose normal. Tacky mucous membranes.  Eyes: Pupils are equal and reactive, Conjunctiva normal, Non-icteric.   Neck: Normal range of motion, No tenderness, Supple, No stridor. No meningeal signs.  Lymphatic: No lymphadenopathy noted.   Cardiovascular: Tachycardic with regular rhythm, no murmurs. Pulses symmetrical.  Thorax & Lungs: Normal breath sounds, No respiratory distress, No wheezing, No chest tenderness.   Abdomen: Bowel sounds normal, Soft, No tenderness, No masses, No pulsatile masses. No peritoneal signs.  Skin: Warm, Dry, No erythema, No rash.   Back: Normal alignment.  Extremities: Intact distal pulses, No edema, No tenderness, No cyanosis.  Musculoskeletal: Good range of motion in all major joints. No tenderness to palpation or major deformities noted.   Neurologic: Alert, Normal speech, Normal vision, Normal finger to nose, No pronator drift, No dysdiadochokinesia, normal motor function, Normal sensory function, normal gait and stance, no focal deficits noted.   Psychiatric: Affect normal, Judgment normal, Mood normal.     DIAGNOSTIC STUDIES / PROCEDURES    LABS  Results for orders placed or performed during the hospital encounter of 03/26/21   CBC WITH DIFFERENTIAL "   Result Value Ref Range    WBC 12.4 (H) 4.8 - 10.8 K/uL    RBC 4.07 (L) 4.20 - 5.40 M/uL    Hemoglobin 12.3 12.0 - 16.0 g/dL    Hematocrit 35.6 (L) 37.0 - 47.0 %    MCV 87.5 81.4 - 97.8 fL    MCH 30.2 27.0 - 33.0 pg    MCHC 34.6 33.6 - 35.0 g/dL    RDW 39.4 35.9 - 50.0 fL    Platelet Count 243 164 - 446 K/uL    MPV 9.3 9.0 - 12.9 fL    Neutrophils-Polys 76.70 (H) 44.00 - 72.00 %    Lymphocytes 17.10 (L) 22.00 - 41.00 %    Monocytes 4.80 0.00 - 13.40 %    Eosinophils 0.70 0.00 - 6.90 %    Basophils 0.20 0.00 - 1.80 %    Immature Granulocytes 0.50 0.00 - 0.90 %    Nucleated RBC 0.00 /100 WBC    Neutrophils (Absolute) 9.53 (H) 2.00 - 7.15 K/uL    Lymphs (Absolute) 2.12 1.00 - 4.80 K/uL    Monos (Absolute) 0.59 0.00 - 0.85 K/uL    Eos (Absolute) 0.09 0.00 - 0.51 K/uL    Baso (Absolute) 0.03 0.00 - 0.12 K/uL    Immature Granulocytes (abs) 0.06 0.00 - 0.11 K/uL    NRBC (Absolute) 0.00 K/uL   COMP METABOLIC PANEL   Result Value Ref Range    Sodium 132 (L) 135 - 145 mmol/L    Potassium 3.8 3.6 - 5.5 mmol/L    Chloride 101 96 - 112 mmol/L    Co2 21 20 - 33 mmol/L    Anion Gap 10.0 7.0 - 16.0    Glucose 100 (H) 65 - 99 mg/dL    Bun 8 8 - 22 mg/dL    Creatinine 0.40 (L) 0.50 - 1.40 mg/dL    Calcium 9.3 8.5 - 10.5 mg/dL    AST(SGOT) 11 (L) 12 - 45 U/L    ALT(SGPT) 8 2 - 50 U/L    Alkaline Phosphatase 66 30 - 99 U/L    Total Bilirubin 0.2 0.1 - 1.5 mg/dL    Albumin 3.8 3.2 - 4.9 g/dL    Total Protein 7.5 6.0 - 8.2 g/dL    Globulin 3.7 (H) 1.9 - 3.5 g/dL    A-G Ratio 1.0 g/dL   LACTIC ACID   Result Value Ref Range    Lactic Acid 0.9 0.5 - 2.0 mmol/L   RH TYPE FOR RHOGAM FROM E.D.   Result Value Ref Range    Emergency Department Rh Typing POS     Number Of Rh Doses Indicated ZERO    ESTIMATED GFR   Result Value Ref Range    GFR If African American >60 >60 mL/min/1.73 m 2    GFR If Non African American >60 >60 mL/min/1.73 m 2   COV-2, FLU A/B, AND RSV BY PCR (2-4 HOURS CEPHEID): Collect NP swab in [facility]    Specimen: Nasopharyngeal;  Respirate   Result Value Ref Range    Influenza virus A RNA Negative Negative    Influenza virus B, PCR Negative Negative    RSV, PCR Negative Negative    SARS-CoV-2 by PCR NotDetected     SARS-CoV-2 Source NP Swab      RADIOLOGY  US-OB transabdominal    COURSE & MEDICAL DECISION MAKING  Pertinent Labs & Imaging studies reviewed. (See chart for details)  This is a 25-year-old female at approximately 14 weeks gestation who is here with headache, photophobia, phonophobia, nausea, and vomiting.  She notes that she had a tactile fever at home but is afebrile here.  Her last dose of Tylenol was greater than 4 hours prior to arrival.  Differential diagnosis includes, but is not limited to, migraine headache, cluster headache, CVA, subarachnoid hemorrhage, dural venous sinus thrombus, hypoglycemia, dehydration, CNS infection, viral syndrome.    Every communication was performed via Syriac  iPad.    She arrives tachycardic with otherwise normal vital signs.  She appears dehydrated with dry mucous membranes but nontoxic.  She is alert, oriented.  Neurologic exam is completely benign and nonfocal.  Neck is supple without any meningeal signs.  Doubt CVA or CNS infection.  I did initially start some Benadryl and Compazine as well as IV fluids and she felt uncomfortable after the Compazine so was given a dose of Ativan which improved her symptoms.    Patient does have a mild leukocytosis to 12.4 with neutrophilic predominance.  Metabolic panel demonstrates hyponatremia to 132 and blood glucose of 100.  Lactic acid is normal.  Given that the patient complains of continued bleeding throughout her pregnancy an obstetric ultrasound was performed that demonstrated a single IUP at 14w6d.  I did not perform a pelvic exam as patient notes this is unchanged and she has already been seen by her OB/GYN for this.     She is Rh+ and does not require RhoGam.    A SARS-CoV-2 swab was performed and was negative.    Patient  was reevaluated at bedside.  Her headache has improved slightly but she is still in discomfort.  I feel the patient will require MRV to rule out dural venous sinus thrombosis given her persistent headache and blurred vision.    Patient was discussed with the hospitalist and admitted in guarded condition.    HYDRATION  HYDRATION: Based on the patient's presentation of Dehydration and Tachycardia the patient was given IV fluids. IV Hydration was used because oral hydration was not adequate alone. Upon recheck following hydration, the patient was Improved.    FINAL IMPRESSION  1.  Headache    Electronically signed by: Tyree Blanton M.D., 3/26/2021 2:54 AM

## 2021-03-26 NOTE — ED NOTES
Med Rec completed per patient and significant other at bedside.   With  (Mckenna #[id number])  Allergies reviewed  No ORAL antibiotics in last 14 days

## 2021-03-26 NOTE — ED TRIAGE NOTES
Kash Patel  25 y.o. female  Chief Complaint   Patient presents with   • Headache     Pt reports HA 10/10 that started this AM, some blurry vision and spots in vision when standing up. Pt took 1500mg tylenol today. 14.5 weeks pregnant.       used for triage, [id number].    Vitals:    03/26/21 0027   BP: 102/66   Pulse: (!) 107   Resp: 14   Temp: 37.3 °C (99.1 °F)   SpO2: 98%        Pt ambulated independently into triage with steady gait, mask in place, NAD.  Pt educated on triage process. Pt encouraged to alert staff for any changes.

## 2021-03-26 NOTE — PROGRESS NOTES
2 RN Skin Check      2 RN skin check complete with: Ursula  Devices in place: PIV  Skin assess under devices: yes  Confirmed pressure ulcers found on: n/a  New potential pressure ulcers noted on: n/a  Wound consult placed: n/a    The following interventions in place: pressure distribution mattress, moisturizer, pillows for positioning and support    Skin assessment:  Ears: bilateral intact pink  Elbows: bilateral intact pink  Feet: bilateral pink intact  Sacrum: pink intact  Abdomen:  Midline scar

## 2021-03-26 NOTE — PROGRESS NOTES
Hospitalist Short Progress Note    Pt was admitted past midnight. Dr. Ramirez's H&P, ED course, Labs and Imagings reviewed.    25 y.o. F with ~14 weeks and 6 days gestation presented 3/26/2021 with complaint of severe headache getting worse since waking at 9 AM 3/25; associated photophobia, phonophobia, some blurry vision (resolved), nausea and one episode of non-bloody non-bilious emesis. Despite treatment in ED - IVF, Benadryl and compazine - Pt was still having nonintractable headache. Hence, subsequently admitted for monitoring of this headache and to r/o dural venous sinus thrombosis with MRV.     Vitals reviewed; afebrile.  The rest of the vitals within normal parameters except a bit soft BP (MAP >65).   Pain/headache reported around 3 in right side this AM.       services were used in the patient's primary language of Upper sorbian.   Name or Number: Kait   Mode of interpretation: iPad  Content of Interpretation: history taking, treatment plan    At bedside alongside her , Pt reported feeling a bit better earlier but her intermittent headache band-like, pulsatile is coming back. Denies blurry vision, neck stiffness or changes in motor/sensory. First experience with this kind of headache. Current treatment plan with pain management, gentle hydration and MRV explained.     Pt was seen by GYN recently - so far uncomplicated pregnancy course.      Exam: grossly unremarkable, CN II-XII intact, no focal neuro deficit, EOMI, PERRL, no nystagmus, intact visual fields, motor strength equal 5/5      Labs reviewed   Na 132  WBC 12.4    Imagings US reviewed; MRV pending      Plan for today:  Agree with Dr. Ramirez's Assessment and Plan   -Would follow up MRV  -Would continue with current pain regimen  -Doubt active infection - UA requested on admission  -Would try one time MgSO4 for headache  -Start gentle hydration   -If MRV is negative and headache improves in AM, can be DC with outpatient  OB follow up.

## 2021-03-26 NOTE — ED NOTES
Pt to room from lob. Changed into gown. Connected to monitor. Agree with triage note. Chart up for ERP. Call light in reach.

## 2021-03-26 NOTE — ASSESSMENT & PLAN NOTE
-Patient is pregnant and presented with acute severe headache, no history of headache syndromes.  -Headache persisted and improved only minimally despite pain medications, will order MRV to rule out dural venous sinus thrombosis

## 2021-03-27 VITALS
HEIGHT: 64 IN | DIASTOLIC BLOOD PRESSURE: 55 MMHG | BODY MASS INDEX: 22.66 KG/M2 | RESPIRATION RATE: 18 BRPM | WEIGHT: 132.72 LBS | HEART RATE: 84 BPM | SYSTOLIC BLOOD PRESSURE: 84 MMHG | OXYGEN SATURATION: 99 % | TEMPERATURE: 97.6 F

## 2021-03-27 PROBLEM — N30.00 ACUTE CYSTITIS: Status: ACTIVE | Noted: 2021-03-27

## 2021-03-27 PROBLEM — E87.1 HYPONATREMIA: Status: ACTIVE | Noted: 2021-03-27

## 2021-03-27 PROBLEM — E87.6 HYPOKALEMIA: Status: ACTIVE | Noted: 2021-03-27

## 2021-03-27 PROBLEM — D64.9 NORMOCYTIC ANEMIA: Status: ACTIVE | Noted: 2021-03-27

## 2021-03-27 LAB
ALBUMIN SERPL BCP-MCNC: 3.3 G/DL (ref 3.2–4.9)
ALBUMIN/GLOB SERPL: 1.2 G/DL
ALP SERPL-CCNC: 54 U/L (ref 30–99)
ALT SERPL-CCNC: 9 U/L (ref 2–50)
ANION GAP SERPL CALC-SCNC: 7 MMOL/L (ref 7–16)
AST SERPL-CCNC: 9 U/L (ref 12–45)
BASOPHILS # BLD AUTO: 0.4 % (ref 0–1.8)
BASOPHILS # BLD: 0.04 K/UL (ref 0–0.12)
BILIRUB SERPL-MCNC: <0.2 MG/DL (ref 0.1–1.5)
BUN SERPL-MCNC: 6 MG/DL (ref 8–22)
CALCIUM SERPL-MCNC: 8.2 MG/DL (ref 8.5–10.5)
CHLORIDE SERPL-SCNC: 106 MMOL/L (ref 96–112)
CO2 SERPL-SCNC: 22 MMOL/L (ref 20–33)
CREAT SERPL-MCNC: 0.31 MG/DL (ref 0.5–1.4)
EOSINOPHIL # BLD AUTO: 0.18 K/UL (ref 0–0.51)
EOSINOPHIL NFR BLD: 2 % (ref 0–6.9)
ERYTHROCYTE [DISTWIDTH] IN BLOOD BY AUTOMATED COUNT: 40.8 FL (ref 35.9–50)
GLOBULIN SER CALC-MCNC: 2.8 G/DL (ref 1.9–3.5)
GLUCOSE SERPL-MCNC: 93 MG/DL (ref 65–99)
HCT VFR BLD AUTO: 31 % (ref 37–47)
HGB BLD-MCNC: 10.5 G/DL (ref 12–16)
IMM GRANULOCYTES # BLD AUTO: 0.04 K/UL (ref 0–0.11)
IMM GRANULOCYTES NFR BLD AUTO: 0.4 % (ref 0–0.9)
LYMPHOCYTES # BLD AUTO: 3.04 K/UL (ref 1–4.8)
LYMPHOCYTES NFR BLD: 32.9 % (ref 22–41)
MCH RBC QN AUTO: 30.2 PG (ref 27–33)
MCHC RBC AUTO-ENTMCNC: 33.9 G/DL (ref 33.6–35)
MCV RBC AUTO: 89.1 FL (ref 81.4–97.8)
MONOCYTES # BLD AUTO: 0.73 K/UL (ref 0–0.85)
MONOCYTES NFR BLD AUTO: 7.9 % (ref 0–13.4)
NEUTROPHILS # BLD AUTO: 5.2 K/UL (ref 2–7.15)
NEUTROPHILS NFR BLD: 56.4 % (ref 44–72)
NRBC # BLD AUTO: 0 K/UL
NRBC BLD-RTO: 0 /100 WBC
PLATELET # BLD AUTO: 215 K/UL (ref 164–446)
PMV BLD AUTO: 9.7 FL (ref 9–12.9)
POTASSIUM SERPL-SCNC: 3.4 MMOL/L (ref 3.6–5.5)
PROT SERPL-MCNC: 6.1 G/DL (ref 6–8.2)
RBC # BLD AUTO: 3.48 M/UL (ref 4.2–5.4)
SODIUM SERPL-SCNC: 135 MMOL/L (ref 135–145)
WBC # BLD AUTO: 9.2 K/UL (ref 4.8–10.8)

## 2021-03-27 PROCEDURE — 99217 PR OBSERVATION CARE DISCHARGE: CPT | Performed by: STUDENT IN AN ORGANIZED HEALTH CARE EDUCATION/TRAINING PROGRAM

## 2021-03-27 PROCEDURE — G0378 HOSPITAL OBSERVATION PER HR: HCPCS

## 2021-03-27 PROCEDURE — 85025 COMPLETE CBC W/AUTO DIFF WBC: CPT

## 2021-03-27 PROCEDURE — 700102 HCHG RX REV CODE 250 W/ 637 OVERRIDE(OP): Performed by: STUDENT IN AN ORGANIZED HEALTH CARE EDUCATION/TRAINING PROGRAM

## 2021-03-27 PROCEDURE — 700111 HCHG RX REV CODE 636 W/ 250 OVERRIDE (IP): Performed by: STUDENT IN AN ORGANIZED HEALTH CARE EDUCATION/TRAINING PROGRAM

## 2021-03-27 PROCEDURE — A9270 NON-COVERED ITEM OR SERVICE: HCPCS | Performed by: STUDENT IN AN ORGANIZED HEALTH CARE EDUCATION/TRAINING PROGRAM

## 2021-03-27 PROCEDURE — 700105 HCHG RX REV CODE 258: Performed by: STUDENT IN AN ORGANIZED HEALTH CARE EDUCATION/TRAINING PROGRAM

## 2021-03-27 PROCEDURE — 80053 COMPREHEN METABOLIC PANEL: CPT

## 2021-03-27 PROCEDURE — 96375 TX/PRO/DX INJ NEW DRUG ADDON: CPT

## 2021-03-27 RX ORDER — NITROFURANTOIN 25; 75 MG/1; MG/1
100 CAPSULE ORAL 2 TIMES DAILY WITH MEALS
Qty: 10 CAPSULE | Refills: 0 | Status: SHIPPED | OUTPATIENT
Start: 2021-03-27 | End: 2021-04-01

## 2021-03-27 RX ORDER — SODIUM CHLORIDE 9 MG/ML
500 INJECTION, SOLUTION INTRAVENOUS ONCE
Status: COMPLETED | OUTPATIENT
Start: 2021-03-27 | End: 2021-03-27

## 2021-03-27 RX ORDER — NITROFURANTOIN 25; 75 MG/1; MG/1
100 CAPSULE ORAL 2 TIMES DAILY WITH MEALS
Status: DISCONTINUED | OUTPATIENT
Start: 2021-03-27 | End: 2021-03-27 | Stop reason: HOSPADM

## 2021-03-27 RX ORDER — METOCLOPRAMIDE 10 MG/1
10 TABLET ORAL 4 TIMES DAILY PRN
Qty: 20 TABLET | Refills: 0 | Status: SHIPPED | OUTPATIENT
Start: 2021-03-27 | End: 2021-04-01

## 2021-03-27 RX ORDER — POTASSIUM CHLORIDE 20 MEQ/1
40 TABLET, EXTENDED RELEASE ORAL ONCE
Status: COMPLETED | OUTPATIENT
Start: 2021-03-27 | End: 2021-03-27

## 2021-03-27 RX ORDER — METOCLOPRAMIDE HYDROCHLORIDE 5 MG/ML
10 INJECTION INTRAMUSCULAR; INTRAVENOUS ONCE
Status: COMPLETED | OUTPATIENT
Start: 2021-03-27 | End: 2021-03-27

## 2021-03-27 RX ADMIN — NITROFURANTOIN MONOHYDRATE/MACROCRYSTALLINE 100 MG: 25; 75 CAPSULE ORAL at 08:18

## 2021-03-27 RX ADMIN — PRENATAL WITH FERROUS FUM AND FOLIC ACID 1 TABLET: 3080; 920; 120; 400; 22; 1.84; 3; 20; 10; 1; 12; 200; 27; 25; 2 TABLET ORAL at 08:18

## 2021-03-27 RX ADMIN — POTASSIUM CHLORIDE 40 MEQ: 1500 TABLET, EXTENDED RELEASE ORAL at 08:18

## 2021-03-27 RX ADMIN — SODIUM CHLORIDE: 9 INJECTION, SOLUTION INTRAVENOUS at 01:55

## 2021-03-27 RX ADMIN — METOCLOPRAMIDE 10 MG: 5 INJECTION, SOLUTION INTRAMUSCULAR; INTRAVENOUS at 08:18

## 2021-03-27 RX ADMIN — SODIUM CHLORIDE 500 ML: 9 INJECTION, SOLUTION INTRAVENOUS at 08:12

## 2021-03-27 NOTE — CARE PLAN
Problem: Communication  Goal: The ability to communicate needs accurately and effectively will improve  Outcome: PROGRESSING AS EXPECTED    - Using language line to communicate with patient in Occitan. Pt addressing questions and needs appropriately with use of .       Problem: Pain Management  Goal: Pain level will decrease to patient's comfort goal  Outcome: PROGRESSING AS EXPECTED    - Pain level at 1-2 throughout night. Pt comfortable and understands that if she needs anything for pain, medication and other comfort measures are available to her.

## 2021-03-27 NOTE — DISCHARGE INSTRUCTIONS
Discharge Instructions    Discharged to home by car with relative. Discharged via wheelchair, hospital escort: Yes.  Special equipment needed: Not Applicable    Be sure to schedule a follow-up appointment with your primary care doctor or any specialists as instructed.     Discharge Plan:   Influenza Vaccine Indication: Not indicated: Previously immunized this influenza season and > 8 years of age    I understand that a diet low in cholesterol, fat, and sodium is recommended for good health. Unless I have been given specific instructions below for another diet, I accept this instruction as my diet prescription.   Other diet: regular    Special Instructions: f/u with OB    · Is patient discharged on Warfarin / Coumadin?   No     Depression / Suicide Risk    As you are discharged from this [facility] facility, it is important to learn how to keep safe from harming yourself.    Recognize the warning signs:  · Abrupt changes in personality, positive or negative- including increase in energy   · Giving away possessions  · Change in eating patterns- significant weight changes-  positive or negative  · Change in sleeping patterns- unable to sleep or sleeping all the time   · Unwillingness or inability to communicate  · Depression  · Unusual sadness, discouragement and loneliness  · Talk of wanting to die  · Neglect of personal appearance   · Rebelliousness- reckless behavior  · Withdrawal from people/activities they love  · Confusion- inability to concentrate     If you or a loved one observes any of these behaviors or has concerns about self-harm, here's what you can do:  · Talk about it- your feelings and reasons for harming yourself  · Remove any means that you might use to hurt yourself (examples: pills, rope, extension cords, firearm)  · Get professional help from the community (Mental Health, Substance Abuse, psychological counseling)  · Do not be alone:Call your Safe Contact- someone whom you trust who will be  there for you.  · Call your local CRISIS HOTLINE 584-1674 or 302-372-1340  · Call your local Children's Mobile Crisis Response Team [place] (705) 577-0226 or www.[employer]  · Call the toll free National Suicide Prevention Hotlines   · National Suicide Prevention Lifeline 208-593-FJYL (8855)  · National Drybar Line Network 800-SUICIDE (703-7024)

## 2021-03-27 NOTE — CARE PLAN
Problem: Communication  Goal: The ability to communicate needs accurately and effectively will improve  Outcome: PROGRESSING AS EXPECTED  Intervention: Use communication aids and/or /Language Line as appropriate  Flowsheets (Taken 3/26/2021 1730)  Patient's Primary Language: Albanian  Does Pt Need Special Equipment for the Hearing Impaired?: No  Note: Pt's primary language is Sami.  Pt requested that spouse be co-learner.  Utilize Language and in-house interpreters     Problem: Safety  Goal: Will remain free from falls  Outcome: PROGRESSING AS EXPECTED

## 2021-03-27 NOTE — PROGRESS NOTES
Language Line via iPad used for this assessment.  Pt reporting pain level of 1 to 2, which she states is more of a very mild dizzy feeling.  Denies any intervention at this time.  NS currently running at 100 mL/hr.  All needs met at this time.

## 2021-03-28 NOTE — DISCHARGE SUMMARY
Discharge Summary    CHIEF COMPLAINT ON ADMISSION  Chief Complaint   Patient presents with   • Headache     Pt reports HA 10/10 that started this AM, some blurry vision and spots in vision when standing up. Pt took 1500mg tylenol today. 14.5 weeks pregnant.        Reason for Admission  Headache     Admission Date  3/26/2021    CODE STATUS  FULL    HPI & HOSPITAL COURSE  25 y.o. F with ~14 weeks and 6 days gestation presented 3/26/2021 with complaint of severe headache getting worse since waking at 9 AM 3/25; associated photophobia, phonophobia, some blurry vision (resolved), nausea and one episode of non-bloody non-bilious emesis. Despite treatment in ED - IVF, Benadryl and compazine - Pt was still having nonintractable headache. Hence, subsequently admitted for monitoring of this headache and to r/o dural venous sinus thrombosis with MRV. Labs normal for mild normocytic anemia.     In the AM on the unit: afebrile; a bit soft BP (MAP >65).   At bedside alongside her , Pt reported feeling a bit better earlier but her intermittent headache band-like, pulsatile is coming back. Denied blurry vision, neck stiffness or changes in motor/sensory. First experience with this kind of headache. Current treatment plan with pain management, gentle hydration and MRV explained.     MRV done showed no evidence of venous sinus thrombosis. Hence, Pt was continued to monitor. UA sent actually showed small LE with few bacteria. Pt denies urinary symptoms but in the setting of pregnancy, plan to treat asymptomatic bacteruria discussed.     3/27 this AM, BP was 76/44 - Pt reported feeling a bit dizzy to RN before rounds. 500cc NS IVF provided. K supplemented. Previously noted hyponatremia resolved. When seen during rounds, no complain - dizziness has resolved, her headache has subsided as well. Orthostatic BP measured - negative. Hence, at this point, plan to discharge home with a follow up discussed.         Therefore, she is  discharged in fair and stable condition to home with close outpatient follow-up.    The patient recovered much more quickly than anticipated on admission.    Discharge Date  3/27/2021    FOLLOW UP ITEMS POST DISCHARGE  N/A    DISCHARGE DIAGNOSES  Principal Problem:    Acute intractable headache POA: Unknown  Active Problems:    14 weeks gestation of pregnancy POA: Yes    Normocytic anemia POA: Yes    Acute cystitis POA: Yes    Hypokalemia POA: Unknown    Hyponatremia POA: Yes    Leukocytosis POA: Yes  Resolved Problems:    * No resolved hospital problems. *      FOLLOW UP  Future Appointments   Date Time Provider Department Center   4/20/2021  3:30 PM GEOVANI Tracey [facility]R [facility]   5/4/2021  1:30 PM P[facility]  1 [id number] [facility]       MEDICATIONS ON DISCHARGE     Medication List      START taking these medications      Instructions   metoclopramide 10 MG Tabs  Commonly known as: REGLAN   Take 1 tablet by mouth 4 times a day as needed (Headache/migraine not relieved by Acetaminophen (Tylenol)) for up to 5 days.  Dose: 10 mg     nitrofurantoin 100 MG Caps  Commonly known as: MACROBID   Take 1 capsule by mouth 2 times a day, with meals for 5 days.  Dose: 100 mg        CONTINUE taking these medications      Instructions   acetaminophen 500 MG Tabs  Commonly known as: TYLENOL   Take 500 mg by mouth 3 times a day. Headache  Dose: 500 mg     PRENATAL 1 PO   Take 1 tablet by mouth every day.  Dose: 1 tablet        STOP taking these medications    promethazine 25 MG Tabs  Commonly known as: PHENERGAN            Allergies  No Known Allergies    DIET  Regular     ACTIVITY  As tolerated.  Weight bearing as tolerated    CONSULTATIONS  N/A    PROCEDURES  MRV   FINDINGS:  The superior sagittal sinus is widely patent. Bilateral transverse and sigmoid sinuses appear widely patent. The deep venous system is widely patent. There is no evidence of venous sinus thrombosis.     IMPRESSION:     There is no evidence of venous sinus  thrombosis.    LABORATORY  Lab Results   Component Value Date    SODIUM 135 03/27/2021    POTASSIUM 3.4 (L) 03/27/2021    CHLORIDE 106 03/27/2021    CO2 22 03/27/2021    GLUCOSE 93 03/27/2021    BUN 6 (L) 03/27/2021    CREATININE 0.31 (L) 03/27/2021        Lab Results   Component Value Date    WBC 9.2 03/27/2021    HEMOGLOBIN 10.5 (L) 03/27/2021    HEMATOCRIT 31.0 (L) 03/27/2021    PLATELETCT 215 03/27/2021        Total time of the discharge process exceeds 35 minutes.

## 2021-03-29 LAB
BACTERIA UR CULT: NORMAL
SIGNIFICANT IND 70042: NORMAL
SITE SITE: NORMAL
SOURCE SOURCE: NORMAL

## 2021-04-14 ENCOUNTER — ANESTHESIA (OUTPATIENT)
Dept: ANESTHESIOLOGY | Facility: MEDICAL CENTER | Age: 26
End: 2021-04-14
Payer: MEDICAID

## 2021-04-14 ENCOUNTER — DATING (OUTPATIENT)
Dept: OBGYN | Facility: CLINIC | Age: 26
End: 2021-04-14

## 2021-04-14 ENCOUNTER — APPOINTMENT (OUTPATIENT)
Dept: RADIOLOGY | Facility: MEDICAL CENTER | Age: 26
End: 2021-04-14
Attending: EMERGENCY MEDICINE
Payer: MEDICAID

## 2021-04-14 ENCOUNTER — ANESTHESIA EVENT (OUTPATIENT)
Dept: ANESTHESIOLOGY | Facility: MEDICAL CENTER | Age: 26
End: 2021-04-14
Payer: MEDICAID

## 2021-04-14 ENCOUNTER — HOSPITAL ENCOUNTER (INPATIENT)
Facility: MEDICAL CENTER | Age: 26
LOS: 1 days | End: 2021-04-15
Attending: EMERGENCY MEDICINE | Admitting: OBSTETRICS & GYNECOLOGY
Payer: MEDICAID

## 2021-04-14 DIAGNOSIS — O03.9 SPONTANEOUS ABORTION: ICD-10-CM

## 2021-04-14 LAB
ABO + RH BLD: NORMAL
ABO GROUP BLD: NORMAL
ALBUMIN SERPL BCP-MCNC: 3.8 G/DL (ref 3.2–4.9)
ALBUMIN/GLOB SERPL: 1.1 G/DL
ALP SERPL-CCNC: 82 U/L (ref 30–99)
ALT SERPL-CCNC: 16 U/L (ref 2–50)
ANION GAP SERPL CALC-SCNC: 8 MMOL/L (ref 7–16)
AST SERPL-CCNC: 16 U/L (ref 12–45)
BASOPHILS # BLD AUTO: 0.2 % (ref 0–1.8)
BASOPHILS # BLD AUTO: 0.2 % (ref 0–1.8)
BASOPHILS # BLD: 0.03 K/UL (ref 0–0.12)
BASOPHILS # BLD: 0.05 K/UL (ref 0–0.12)
BILIRUB SERPL-MCNC: 0.2 MG/DL (ref 0.1–1.5)
BLD GP AB SCN SERPL QL: NORMAL
BUN SERPL-MCNC: 5 MG/DL (ref 8–22)
CALCIUM SERPL-MCNC: 9.2 MG/DL (ref 8.5–10.5)
CHLORIDE SERPL-SCNC: 101 MMOL/L (ref 96–112)
CO2 SERPL-SCNC: 22 MMOL/L (ref 20–33)
CREAT SERPL-MCNC: 0.36 MG/DL (ref 0.5–1.4)
EOSINOPHIL # BLD AUTO: 0.06 K/UL (ref 0–0.51)
EOSINOPHIL # BLD AUTO: 0.06 K/UL (ref 0–0.51)
EOSINOPHIL NFR BLD: 0.3 % (ref 0–6.9)
EOSINOPHIL NFR BLD: 0.4 % (ref 0–6.9)
ERYTHROCYTE [DISTWIDTH] IN BLOOD BY AUTOMATED COUNT: 41.6 FL (ref 35.9–50)
ERYTHROCYTE [DISTWIDTH] IN BLOOD BY AUTOMATED COUNT: 42.3 FL (ref 35.9–50)
GLOBULIN SER CALC-MCNC: 3.5 G/DL (ref 1.9–3.5)
GLUCOSE SERPL-MCNC: 95 MG/DL (ref 65–99)
HBV SURFACE AG SER QL: ABNORMAL
HCT VFR BLD AUTO: 30.4 % (ref 37–47)
HCT VFR BLD AUTO: 38.3 % (ref 37–47)
HCV AB SER QL: ABNORMAL
HGB BLD-MCNC: 10.1 G/DL (ref 12–16)
HGB BLD-MCNC: 12.6 G/DL (ref 12–16)
HIV 1+2 AB+HIV1 P24 AG SERPL QL IA: NORMAL
IMM GRANULOCYTES # BLD AUTO: 0.1 K/UL (ref 0–0.11)
IMM GRANULOCYTES # BLD AUTO: 0.16 K/UL (ref 0–0.11)
IMM GRANULOCYTES NFR BLD AUTO: 0.7 % (ref 0–0.9)
IMM GRANULOCYTES NFR BLD AUTO: 0.8 % (ref 0–0.9)
LYMPHOCYTES # BLD AUTO: 1.81 K/UL (ref 1–4.8)
LYMPHOCYTES # BLD AUTO: 2.46 K/UL (ref 1–4.8)
LYMPHOCYTES NFR BLD: 17.5 % (ref 22–41)
LYMPHOCYTES NFR BLD: 9 % (ref 22–41)
MCH RBC QN AUTO: 29.9 PG (ref 27–33)
MCH RBC QN AUTO: 30.1 PG (ref 27–33)
MCHC RBC AUTO-ENTMCNC: 32.9 G/DL (ref 33.6–35)
MCHC RBC AUTO-ENTMCNC: 33.2 G/DL (ref 33.6–35)
MCV RBC AUTO: 89.9 FL (ref 81.4–97.8)
MCV RBC AUTO: 91.4 FL (ref 81.4–97.8)
MONOCYTES # BLD AUTO: 0.8 K/UL (ref 0–0.85)
MONOCYTES # BLD AUTO: 0.92 K/UL (ref 0–0.85)
MONOCYTES NFR BLD AUTO: 4.6 % (ref 0–13.4)
MONOCYTES NFR BLD AUTO: 5.7 % (ref 0–13.4)
NEUTROPHILS # BLD AUTO: 10.64 K/UL (ref 2–7.15)
NEUTROPHILS # BLD AUTO: 17.05 K/UL (ref 2–7.15)
NEUTROPHILS NFR BLD: 75.5 % (ref 44–72)
NEUTROPHILS NFR BLD: 85.1 % (ref 44–72)
NRBC # BLD AUTO: 0 K/UL
NRBC # BLD AUTO: 0 K/UL
NRBC BLD-RTO: 0 /100 WBC
NRBC BLD-RTO: 0 /100 WBC
PLATELET # BLD AUTO: 203 K/UL (ref 164–446)
PLATELET # BLD AUTO: 230 K/UL (ref 164–446)
PMV BLD AUTO: 10 FL (ref 9–12.9)
PMV BLD AUTO: 9.7 FL (ref 9–12.9)
POTASSIUM SERPL-SCNC: 3.5 MMOL/L (ref 3.6–5.5)
PROT SERPL-MCNC: 7.3 G/DL (ref 6–8.2)
RBC # BLD AUTO: 3.38 M/UL (ref 4.2–5.4)
RBC # BLD AUTO: 4.19 M/UL (ref 4.2–5.4)
RH BLD: NORMAL
RUBV AB SER QL: 33.9 IU/ML
SARS-COV+SARS-COV-2 AG RESP QL IA.RAPID: NOTDETECTED
SARS-COV-2 RNA RESP QL NAA+PROBE: NOTDETECTED
SODIUM SERPL-SCNC: 131 MMOL/L (ref 135–145)
SPECIMEN SOURCE: NORMAL
SPECIMEN SOURCE: NORMAL
TREPONEMA PALLIDUM IGG+IGM AB [PRESENCE] IN SERUM OR PLASMA BY IMMUNOASSAY: ABNORMAL
WBC # BLD AUTO: 14.1 K/UL (ref 4.8–10.8)
WBC # BLD AUTO: 20.1 K/UL (ref 4.8–10.8)

## 2021-04-14 PROCEDURE — U0005 INFEC AGEN DETEC AMPLI PROBE: HCPCS

## 2021-04-14 PROCEDURE — 770002 HCHG ROOM/CARE - OB PRIVATE (112)

## 2021-04-14 PROCEDURE — 86850 RBC ANTIBODY SCREEN: CPT

## 2021-04-14 PROCEDURE — 700102 HCHG RX REV CODE 250 W/ 637 OVERRIDE(OP): Performed by: OBSTETRICS & GYNECOLOGY

## 2021-04-14 PROCEDURE — 86901 BLOOD TYPING SEROLOGIC RH(D): CPT

## 2021-04-14 PROCEDURE — 700111 HCHG RX REV CODE 636 W/ 250 OVERRIDE (IP)

## 2021-04-14 PROCEDURE — 96375 TX/PRO/DX INJ NEW DRUG ADDON: CPT | Mod: EDC

## 2021-04-14 PROCEDURE — 80053 COMPREHEN METABOLIC PANEL: CPT

## 2021-04-14 PROCEDURE — 96374 THER/PROPH/DIAG INJ IV PUSH: CPT | Mod: EDC

## 2021-04-14 PROCEDURE — 86780 TREPONEMA PALLIDUM: CPT

## 2021-04-14 PROCEDURE — 86803 HEPATITIS C AB TEST: CPT

## 2021-04-14 PROCEDURE — U0003 INFECTIOUS AGENT DETECTION BY NUCLEIC ACID (DNA OR RNA); SEVERE ACUTE RESPIRATORY SYNDROME CORONAVIRUS 2 (SARS-COV-2) (CORONAVIRUS DISEASE [COVID-19]), AMPLIFIED PROBE TECHNIQUE, MAKING USE OF HIGH THROUGHPUT TECHNOLOGIES AS DESCRIBED BY CMS-2020-01-R: HCPCS

## 2021-04-14 PROCEDURE — 700105 HCHG RX REV CODE 258: Performed by: OBSTETRICS & GYNECOLOGY

## 2021-04-14 PROCEDURE — 99285 EMERGENCY DEPT VISIT HI MDM: CPT | Mod: EDC

## 2021-04-14 PROCEDURE — 85025 COMPLETE CBC W/AUTO DIFF WBC: CPT

## 2021-04-14 PROCEDURE — 700111 HCHG RX REV CODE 636 W/ 250 OVERRIDE (IP): Performed by: EMERGENCY MEDICINE

## 2021-04-14 PROCEDURE — A9270 NON-COVERED ITEM OR SERVICE: HCPCS | Performed by: OBSTETRICS & GYNECOLOGY

## 2021-04-14 PROCEDURE — 76815 OB US LIMITED FETUS(S): CPT

## 2021-04-14 PROCEDURE — 36415 COLL VENOUS BLD VENIPUNCTURE: CPT

## 2021-04-14 PROCEDURE — 700101 HCHG RX REV CODE 250: Performed by: STUDENT IN AN ORGANIZED HEALTH CARE EDUCATION/TRAINING PROGRAM

## 2021-04-14 PROCEDURE — 86762 RUBELLA ANTIBODY: CPT

## 2021-04-14 PROCEDURE — 86900 BLOOD TYPING SEROLOGIC ABO: CPT

## 2021-04-14 PROCEDURE — 700111 HCHG RX REV CODE 636 W/ 250 OVERRIDE (IP): Performed by: STUDENT IN AN ORGANIZED HEALTH CARE EDUCATION/TRAINING PROGRAM

## 2021-04-14 PROCEDURE — 87389 HIV-1 AG W/HIV-1&-2 AB AG IA: CPT

## 2021-04-14 PROCEDURE — 87426 SARSCOV CORONAVIRUS AG IA: CPT

## 2021-04-14 PROCEDURE — C9803 HOPD COVID-19 SPEC COLLECT: HCPCS | Performed by: EMERGENCY MEDICINE

## 2021-04-14 PROCEDURE — 87340 HEPATITIS B SURFACE AG IA: CPT

## 2021-04-14 PROCEDURE — 3E0P7VZ INTRODUCTION OF HORMONE INTO FEMALE REPRODUCTIVE, VIA NATURAL OR ARTIFICIAL OPENING: ICD-10-PCS | Performed by: OBSTETRICS & GYNECOLOGY

## 2021-04-14 RX ORDER — ROPIVACAINE HYDROCHLORIDE 2 MG/ML
INJECTION, SOLUTION EPIDURAL; INFILTRATION; PERINEURAL CONTINUOUS
Status: DISCONTINUED | OUTPATIENT
Start: 2021-04-14 | End: 2021-04-15

## 2021-04-14 RX ORDER — MORPHINE SULFATE 4 MG/ML
4 INJECTION, SOLUTION INTRAMUSCULAR; INTRAVENOUS ONCE
Status: COMPLETED | OUTPATIENT
Start: 2021-04-14 | End: 2021-04-14

## 2021-04-14 RX ORDER — IBUPROFEN 800 MG/1
800 TABLET ORAL EVERY 8 HOURS PRN
Status: DISCONTINUED | OUTPATIENT
Start: 2021-04-14 | End: 2021-04-15 | Stop reason: HOSPADM

## 2021-04-14 RX ORDER — ACETAMINOPHEN 500 MG
1000 TABLET ORAL EVERY 6 HOURS PRN
Status: DISCONTINUED | OUTPATIENT
Start: 2021-04-14 | End: 2021-04-15 | Stop reason: HOSPADM

## 2021-04-14 RX ORDER — SODIUM CHLORIDE, SODIUM LACTATE, POTASSIUM CHLORIDE, AND CALCIUM CHLORIDE .6; .31; .03; .02 G/100ML; G/100ML; G/100ML; G/100ML
1000 INJECTION, SOLUTION INTRAVENOUS
Status: DISCONTINUED | OUTPATIENT
Start: 2021-04-14 | End: 2021-04-15

## 2021-04-14 RX ORDER — MISOPROSTOL 200 UG/1
400 TABLET ORAL EVERY 4 HOURS PRN
Status: DISCONTINUED | OUTPATIENT
Start: 2021-04-14 | End: 2021-04-15

## 2021-04-14 RX ORDER — ONDANSETRON 2 MG/ML
4 INJECTION INTRAMUSCULAR; INTRAVENOUS ONCE
Status: COMPLETED | OUTPATIENT
Start: 2021-04-14 | End: 2021-04-14

## 2021-04-14 RX ORDER — OXYTOCIN 10 [USP'U]/ML
10 INJECTION, SOLUTION INTRAMUSCULAR; INTRAVENOUS
Status: DISCONTINUED | OUTPATIENT
Start: 2021-04-14 | End: 2021-04-15

## 2021-04-14 RX ORDER — SODIUM CHLORIDE, SODIUM LACTATE, POTASSIUM CHLORIDE, AND CALCIUM CHLORIDE .6; .31; .03; .02 G/100ML; G/100ML; G/100ML; G/100ML
250 INJECTION, SOLUTION INTRAVENOUS PRN
Status: DISCONTINUED | OUTPATIENT
Start: 2021-04-14 | End: 2021-04-15

## 2021-04-14 RX ORDER — ONDANSETRON 4 MG/1
4 TABLET, ORALLY DISINTEGRATING ORAL EVERY 6 HOURS PRN
Status: DISCONTINUED | OUTPATIENT
Start: 2021-04-14 | End: 2021-04-15

## 2021-04-14 RX ORDER — ROPIVACAINE HYDROCHLORIDE 2 MG/ML
INJECTION, SOLUTION EPIDURAL; INFILTRATION; PERINEURAL
Status: COMPLETED
Start: 2021-04-14 | End: 2021-04-14

## 2021-04-14 RX ORDER — ONDANSETRON 2 MG/ML
4 INJECTION INTRAMUSCULAR; INTRAVENOUS EVERY 6 HOURS PRN
Status: DISCONTINUED | OUTPATIENT
Start: 2021-04-14 | End: 2021-04-15

## 2021-04-14 RX ORDER — LIDOCAINE HYDROCHLORIDE AND EPINEPHRINE 15; 5 MG/ML; UG/ML
INJECTION, SOLUTION EPIDURAL
Status: COMPLETED | OUTPATIENT
Start: 2021-04-14 | End: 2021-04-14

## 2021-04-14 RX ORDER — SODIUM CHLORIDE, SODIUM LACTATE, POTASSIUM CHLORIDE, CALCIUM CHLORIDE 600; 310; 30; 20 MG/100ML; MG/100ML; MG/100ML; MG/100ML
INJECTION, SOLUTION INTRAVENOUS CONTINUOUS
Status: ACTIVE | OUTPATIENT
Start: 2021-04-14 | End: 2021-04-14

## 2021-04-14 RX ADMIN — ROPIVACAINE HYDROCHLORIDE 200 MG: 2 INJECTION, SOLUTION EPIDURAL; INFILTRATION; PERINEURAL at 14:16

## 2021-04-14 RX ADMIN — BUPIVACAINE HYDROCHLORIDE 4 ML: 2.5 INJECTION, SOLUTION EPIDURAL; INFILTRATION; INTRACAUDAL; PERINEURAL at 14:20

## 2021-04-14 RX ADMIN — MORPHINE SULFATE 4 MG: 4 INJECTION INTRAVENOUS at 11:16

## 2021-04-14 RX ADMIN — MISOPROSTOL 400 MCG: 200 TABLET ORAL at 17:00

## 2021-04-14 RX ADMIN — ACETAMINOPHEN 1000 MG: 500 TABLET ORAL at 16:44

## 2021-04-14 RX ADMIN — SODIUM CHLORIDE, POTASSIUM CHLORIDE, SODIUM LACTATE AND CALCIUM CHLORIDE: 600; 310; 30; 20 INJECTION, SOLUTION INTRAVENOUS at 13:30

## 2021-04-14 RX ADMIN — SODIUM CHLORIDE, POTASSIUM CHLORIDE, SODIUM LACTATE AND CALCIUM CHLORIDE: 600; 310; 30; 20 INJECTION, SOLUTION INTRAVENOUS at 14:03

## 2021-04-14 RX ADMIN — SODIUM CHLORIDE, POTASSIUM CHLORIDE, SODIUM LACTATE AND CALCIUM CHLORIDE: 600; 310; 30; 20 INJECTION, SOLUTION INTRAVENOUS at 15:38

## 2021-04-14 RX ADMIN — ONDANSETRON 4 MG: 2 INJECTION INTRAMUSCULAR; INTRAVENOUS at 11:16

## 2021-04-14 RX ADMIN — LIDOCAINE HYDROCHLORIDE,EPINEPHRINE BITARTRATE 3 ML: 15; .005 INJECTION, SOLUTION EPIDURAL; INFILTRATION; INTRACAUDAL; PERINEURAL at 14:20

## 2021-04-14 RX ADMIN — MISOPROSTOL 400 MCG: 200 TABLET ORAL at 21:08

## 2021-04-14 RX ADMIN — ROPIVACAINE HYDROCHLORIDE 200 MG: 2 INJECTION, SOLUTION EPIDURAL; INFILTRATION at 14:16

## 2021-04-14 ASSESSMENT — PATIENT HEALTH QUESTIONNAIRE - PHQ9
SUM OF ALL RESPONSES TO PHQ9 QUESTIONS 1 AND 2: 0
2. FEELING DOWN, DEPRESSED, IRRITABLE, OR HOPELESS: NOT AT ALL
1. LITTLE INTEREST OR PLEASURE IN DOING THINGS: NOT AT ALL

## 2021-04-14 ASSESSMENT — LIFESTYLE VARIABLES
EVER HAD A DRINK FIRST THING IN THE MORNING TO STEADY YOUR NERVES TO GET RID OF A HANGOVER: NO
TOTAL SCORE: 0
EVER FELT BAD OR GUILTY ABOUT YOUR DRINKING: NO
TOTAL SCORE: 0
CONSUMPTION TOTAL: NEGATIVE
ON A TYPICAL DAY WHEN YOU DRINK ALCOHOL HOW MANY DRINKS DO YOU HAVE: 0
HOW MANY TIMES IN THE PAST YEAR HAVE YOU HAD 5 OR MORE DRINKS IN A DAY: 0
HAVE YOU EVER FELT YOU SHOULD CUT DOWN ON YOUR DRINKING: NO
ALCOHOL_USE: NO
AVERAGE NUMBER OF DAYS PER WEEK YOU HAVE A DRINK CONTAINING ALCOHOL: 0
EVER_SMOKED: NEVER
TOTAL SCORE: 0
HAVE PEOPLE ANNOYED YOU BY CRITICIZING YOUR DRINKING: NO

## 2021-04-14 ASSESSMENT — COPD QUESTIONNAIRES
DURING THE PAST 4 WEEKS HOW MUCH DID YOU FEEL SHORT OF BREATH: NONE/LITTLE OF THE TIME
COPD SCREENING SCORE: 0
DO YOU EVER COUGH UP ANY MUCUS OR PHLEGM?: NO/ONLY WITH OCCASIONAL COLDS OR INFECTIONS
HAVE YOU SMOKED AT LEAST 100 CIGARETTES IN YOUR ENTIRE LIFE: NO/DON'T KNOW
IN THE PAST 12 MONTHS DO YOU DO LESS THAN YOU USED TO BECAUSE OF YOUR BREATHING PROBLEMS: DISAGREE/UNSURE

## 2021-04-14 ASSESSMENT — PAIN DESCRIPTION - PAIN TYPE: TYPE: ACUTE PAIN

## 2021-04-14 ASSESSMENT — FIBROSIS 4 INDEX: FIB4 SCORE: 0.38

## 2021-04-14 NOTE — ED NOTES
Med rec completed per Pt at bedside with  Trenton ([id number]).  Allergies reviewed with Pt. No known drug allergies.  No oral antibiotics in last 14 days.

## 2021-04-14 NOTE — H&P
OB H&P:    CC: loss of fluid    Visit/counseling conducted with in person PAR interpretor.  HPI:  Ms. Kash Patel is a 25 y.o.  @ 17w5d by 7wk US with loss of fluid in the 8 o'clock hour.  Reports a large gush with continued leakage, has also started having vaginal bleeding.  Reports lower abdominal cramping every few minutes.      PNC with [facility] women's [facility],    PNL: not yet drawn  Rh+      ROS:  Const: denies fevers, general concerns  CV/resp: denies CP/concerns  Resp: denies cough, SOB, concerns  GI: denies abd pain, GI concerns  : see HPI  Neuro: reports no HA/vision changes    OB History    Para Term  AB Living   4 2 2   1 2   SAB TAB Ectopic Molar Multiple Live Births   1         2      # Outcome Date GA Lbr Hussain/2nd Weight Sex Delivery Anes PTL Lv   4 Current            3 Term 10/08/18 40w0d  2.2 kg (4 lb 13.6 oz) M CS-Unspec EPI, Spinal N BRITTANY      Birth Comments: C/Section for repeat   2 Term 06/19/15 40w0d  3.3 kg (7 lb 4.4 oz) M CS-Unspec Spinal N BRITTANY      Birth Comments: C/Section dut ot unable to dilate   1 SAB  12w0d    SAB         Birth Comments: Pt states had an infection. D&C needed       GYN: denies STIs, abnl paps    Past Medical History:   Diagnosis Date   • Hypertension     PIH   • Normocytic anemia 3/27/2021       Past Surgical History:   Procedure Laterality Date   • REPEAT C SECTION  10/08/2018    done in [facility]   • PRIMARY C SECTION  2015    done in [facility]   • DILATION AND CURETTAGE         No current facility-administered medications on file prior to encounter.     Current Outpatient Medications on File Prior to Encounter   Medication Sig Dispense Refill   • acetaminophen (TYLENOL) 500 MG Tab Take 500 mg by mouth 3 times a day. Headache     • Prenatal MV-Min-Fe Fum-FA-DHA (PRENATAL 1 PO) Take 1 tablet by mouth every day.         Family History   Problem Relation Age of Onset   • No Known Problems Mother    •  "Diabetes Father    • No Known Problems Sister    • No Known Problems Brother    • Cancer Maternal Grandmother    • Alcohol abuse Maternal Grandfather    • Diabetes Paternal Grandmother    • Hyperlipidemia Paternal Grandfather        Social History     Tobacco Use   • Smoking status: Never Smoker   • Smokeless tobacco: Never Used   Substance Use Topics   • Alcohol use: Not Currently   • Drug use: Not Currently     Types: Marijuana, Inhaled         PE:  Vitals:    21 0953 21 1009 21 1101 21 1115   BP:  129/57 104/64    Pulse:  95 89    Resp:  16 16 16   Temp:       TempSrc:       SpO2:  99% 98%    Weight: 59.9 kg (132 lb)      Height: 1.626 m (5' 4\")        gen: AAO, NAD  CV: RRR  Resp; ctab  abd: soft, gravid, NT, fundus below umbilicus  : NEFG, bloody fluid noted in vagina with small trailing membranes noted at dilated os  Ext: NT, no edema    SVE: 3/20/-3     US: FINDINGS:  Fetal Lie:  Breech  LMP:  2020  Clinical OKSANA by LMP:  2021     Placenta (Location):  Anterior     Amniotic Fluid Volume:  ADRIENNE = minimal fluid     Fetal Heart Rate:  103 bpm     Cervical Length:  3.5 cm     IMPRESSION:     Severe oligohydramnios with premature rupture of membranes.        A/P: 25 y.o.  @ 17w5d by 7wk US with hx of c/s x2 here with previable ROM, suspected  labor and inevitable   - discussed option of expectant management vs IOL which I recommend.  Discussed that given cervical dilation and rupture of membranes and early gestational age, anticipate she would continue to progress in labor on her own even if we did nothing.  Discussed that outcomes in this situation are very poor with minimal chance of achieving viability.  Discussed that expectant management comes with increased risk of bleeding and infection.  Recommend induction of labor with medication today although patient offered additional time to make her decision if desired.  Patient would like to proceed with " induction of labor today.  All questions answered.    Will admit to L&D and start IOL with 400mcg q4hrs; prior c/s x2 noted given early gestational age still a candidate for cystotec IOL.        Jessie Moore MD  [facility], Women's Health

## 2021-04-14 NOTE — ANESTHESIA PREPROCEDURE EVALUATION
26 yo F,  @17+5, fetal demise. Plt 230. Not on AC.    Relevant Problems   NEURO   (+) Acute intractable headache      OB   (+) History of C/S x 2 - needs repeat, no BTL       Physical Exam    Airway   Mallampati: II  TM distance: >3 FB  Neck ROM: full       Cardiovascular - normal exam  Rhythm: regular  Rate: normal  (-) murmur     Dental - normal exam           Pulmonary - normal exam  Breath sounds clear to auscultation     Abdominal    Neurological - normal exam                 Anesthesia Plan    ASA 2       Plan - epidural   Neuraxial block will be labor analgesia                  Pertinent diagnostic labs and testing reviewed    Informed Consent:    Anesthetic plan and risks discussed with patient.

## 2021-04-14 NOTE — ED TRIAGE NOTES
"Chief Complaint   Patient presents with   • Abdominal Pain     sharp lower mid abd pain sudden onset this am    • Pregnancy     Pt wheeled to triage for above complaint. 16 weeks pregnant. Reports she woke up with this abd pain, went to the bathroom and noticed a large amount of clear fluid vaginal discharge. Denies fetal movement during this pregnancy so far. Reports a sharp \"contraction like pain\" this am x 1.         Charge RN notified, pt to blue 15.     BP (!) 96/69   Pulse (!) 108   Temp 36.6 °C (97.9 °F) (Temporal)   Resp 20   Ht 1.626 m (5' 4\")   Wt 59.9 kg (132 lb)   LMP 2020   SpO2 100%   BMI 22.66 kg/m²     "

## 2021-04-14 NOTE — PROGRESS NOTES
Pt care assumed. Pt is tearful. Trenton (FOB) at bedside. Neither speak English well. Ipad used for translation. Pt reports cramping and VB but her pain isn't too bad; appears comfortable. Pt has 3 yo and 7 yo boys at home; unsure of the gender for this baby. Asked pt if she wanted more time before IOL started; pt wants to begin. Pt educated pt about process. Pt wanting epidural. Pt would like to have baby come to her after delivery; informed her that baby may still have a heartbeat that the RN will be listening for; will keep baby warm and comfortable. Also informed that baby may pass during the labor process but she can hold the baby as long as she wants. Discussed option for a Christian; pt has not made a decision. Pt appears over-whelmed at this time, so stopped discussion and went to talk with Dr. Hebert about IOL; cytotec ordered. RN checked cervix; difficult to determine; and either an arm or leg palpated; pt does not need to push. Encouraged pt to get epidural now; consenting. Pt prepared for epidural; Dr. Hebert updated. Dr. Rodriguez unavailable but will be in shortly to place  1415: Dr. Rodriguez at bedside to place epidural; tolerated well; negative test dose although BP dropped to 88/52. Assisted to laying down position and BPs running q 2 min; improved. Initially got orders to give ephedrine but then told to hold if SBP greater 90 and DBP above 50  1530: Pt comfortable with epidural. Pt and FOB live here alone with their boys. States she has a cousin here, a sister in [place], and the rest of her family lives in [place].  FOB works full time. When RN asked if she has friends or other support, pt states she has her cousin. Pt has hx of depression; pt has wished to be dead before but hasn't felt that way since delivering her boys. Pt educated about post partum depression and encouraged to call to make appointment with OB after d/c if those thoughts return  1645; Pt c/o h/a; Tylenol given  1700: RN had MD check pt. Fetus  in vagina but head entrapped behind cervix; cytotec given by Dr. Hebert. Pt is comfortable with epidural. FOB asking if their boys can be here to say good bye after she delivers. RN asked Christen Azevedo (supervisor) and received the ok for them to visit to meet and say good bye.  1900: Report given to Marissa PAYTON

## 2021-04-14 NOTE — ED PROVIDER NOTES
ED Provider Note    ED Provider Note    Primary care provider: No primary care provider on file.  Means of arrival: Walk-in  History obtained from: Patient    CHIEF COMPLAINT  Chief Complaint   Patient presents with   • Abdominal Pain     sharp lower mid abd pain sudden onset this am    • Pregnancy     Seen at 10:12 AM.   HPI  Kash Sapp Ez Patel is a 25 y.o. female -0-0-2 EGA 18 weeks by US who presents to the Emergency Department sharp stabbing gradually increasing lower abdominal cramping pain with associated vaginal discharge.  The patient first noticed a rush of fluid followed by some steady vaginal bleeding.  This occurred this morning, she cannot quantify the amount of bleeding but has changed a pad prior to arrival.  Pregnancy this far has been relatively unremarkable.  She does see a obstetrician but does not know the name or what clinic she goes to.    She denies any recent fevers, chills, chest pain, shortness of breath, nausea or vomiting.    REVIEW OF SYSTEMS  See HPI,   Remainder of ROS negative.     PAST MEDICAL HISTORY   has a past medical history of Hypertension and Normocytic anemia (3/27/2021).    SURGICAL HISTORY   has a past surgical history that includes primary c section (2015); repeat c section (10/08/2018); and dilation and curettage ().    SOCIAL HISTORY  Social History     Tobacco Use   • Smoking status: Never Smoker   • Smokeless tobacco: Never Used   Substance Use Topics   • Alcohol use: Not Currently   • Drug use: Not Currently     Types: Marijuana, Inhaled      Social History     Substance and Sexual Activity   Drug Use Not Currently   • Types: Marijuana, Inhaled       FAMILY HISTORY  Family History   Problem Relation Age of Onset   • No Known Problems Mother    • Diabetes Father    • No Known Problems Sister    • No Known Problems Brother    • Cancer Maternal Grandmother    • Alcohol abuse Maternal Grandfather    • Diabetes Paternal Grandmother    •  "Hyperlipidemia Paternal Grandfather        CURRENT MEDICATIONS  Reviewed.  See Encounter Summary.     ALLERGIES  No Known Allergies    PHYSICAL EXAM  VITAL SIGNS: /64   Pulse 92   Temp 36.6 °C (97.9 °F) (Temporal)   Resp 16   Ht 1.626 m (5' 4\")   Wt 59.9 kg (132 lb)   LMP 2020   SpO2 98%   BMI 22.66 kg/m²   Constitutional: Awake, alert in no apparent distress.  Appears uncomfortable and appropriately anxious.  HENT: Normocephalic, Bilateral external ears normal. Nose normal.   Eyes: Conjunctiva normal, non-icteric, EOMI.    Thorax & Lungs: Easy unlabored respirations, Clear to ascultation bilaterally.  Cardiovascular: Borderline tachycardic, No murmurs, rubs or gallops. Bilateral pulses symmetrical.   Abdomen:  Soft, nontender, nondistended, normal active bowel sounds.   : Os is open, oozing blood and products of conception noted.  Estimated 20 cc of dark blood and clot removed.  Skin: Visualized skin is  Dry, No erythema, No rash.   Musculoskeletal:   No cyanosis, clubbing or edema. No leg asymmetry.   Neurologic: Alert, Grossly non-focal.   Psychiatric: Normal affect, Normal mood  Lymphatic:  No cervical LAD    Bedside ultrasound: Severe oligohydramnios, , EGA 18 weeks by HC.    RADIOLOGY  US-OB LIMITED TRANSABDOMINAL   Final Result      Severe oligohydramnios with premature rupture of membranes.            COURSE & MEDICAL DECISION MAKING  Pertinent Labs & Imaging studies reviewed. (See chart for details)    Differential diagnoses include but are not limited to: Inevitable     10:12 AM - Medical record reviewed, patient seen last month for abdominal pain, vaginal bleeding, IUP was confirmed.  She then presented 3 weeks ago for headache, she was admitted for rule out cavernous sinus thrombosis, MRV negative.  Patient Rh+.    10:36 AM: Pelvic exam completed, will page labor and delivery for possible assistance in this matter.    10:45 AM -Case discussed with Dr. Hebert who will " evaluate the patient.    11:15 AM -patient to be transferred up to the labor deck for spontaneous miscarriage, possible D&C.    Decision Making:  This is a pleasant 25 y.o. year old female who presents with abrupt onset of abdominal pain, rush of fluid and vaginal bleeding.  Bedside ultrasound and cervical exam consistent with inevitable miscarriage.  Case discussed with Dr. Hebert who graciously evaluated the patient in the emergency department and agreed to take the patient up to the labor deck for eminent delivery and possible D&C if required.    The patient is Rh+, therefore no indication for RhoGam.  She is hemodynamically stable at this time.  She was given a small amount of morphine and Zofran for pain control prior to transfer upstairs.    Final disposition pending, possible discharge after completed miscarriage.    FINAL IMPRESSION  1. Spontaneous

## 2021-04-14 NOTE — ANESTHESIA PROCEDURE NOTES
Epidural Block    Date/Time: 4/14/2021 2:20 PM  Performed by: Davy Rodriguez M.D.  Authorized by: Davy Rodriguez M.D.     Patient Location:  OB  Start Time:  4/14/2021 2:20 PM  End Time:  4/14/2021 2:33 PM  Reason for Block: labor analgesia    patient identified, IV checked, site marked, risks and benefits discussed, surgical consent, monitors and equipment checked, pre-op evaluation and timeout performed    Patient Position:  Sitting  Prep: ChloraPrep, patient draped and sterile technique    Monitoring:  Blood pressure, continuous pulse oximetry and heart rate  Approach:  Midline  Location:  L3-L4  Injection Technique:  BRETT saline  Skin infiltration:  Lidocaine  Strength:  1%  Dose:  3ml  Needle Type:  Tuohy  Needle Gauge:  17 G  Needle Length:  3.5 in  Loss of resistance::  4  Catheter Size:  19 G  Catheter at Skin Depth:  11  Test Dose Result:  Negative   Bolused w/ Bupiv 0.125% 4mL.

## 2021-04-15 ENCOUNTER — TELEPHONE (OUTPATIENT)
Dept: OBGYN | Facility: CLINIC | Age: 26
End: 2021-04-15

## 2021-04-15 ENCOUNTER — PHARMACY VISIT (OUTPATIENT)
Dept: PHARMACY | Facility: MEDICAL CENTER | Age: 26
End: 2021-04-15
Payer: COMMERCIAL

## 2021-04-15 ENCOUNTER — ANESTHESIA EVENT (OUTPATIENT)
Dept: OBGYN | Facility: MEDICAL CENTER | Age: 26
End: 2021-04-15
Payer: MEDICAID

## 2021-04-15 ENCOUNTER — ANESTHESIA (OUTPATIENT)
Dept: OBGYN | Facility: MEDICAL CENTER | Age: 26
End: 2021-04-15
Payer: MEDICAID

## 2021-04-15 VITALS
OXYGEN SATURATION: 99 % | HEART RATE: 83 BPM | WEIGHT: 132 LBS | BODY MASS INDEX: 22.53 KG/M2 | TEMPERATURE: 99.4 F | HEIGHT: 64 IN | RESPIRATION RATE: 18 BRPM | DIASTOLIC BLOOD PRESSURE: 52 MMHG | SYSTOLIC BLOOD PRESSURE: 92 MMHG

## 2021-04-15 LAB — PATHOLOGY CONSULT NOTE: NORMAL

## 2021-04-15 PROCEDURE — 160048 HCHG OR STATISTICAL LEVEL 1-5: Performed by: OBSTETRICS & GYNECOLOGY

## 2021-04-15 PROCEDURE — 700111 HCHG RX REV CODE 636 W/ 250 OVERRIDE (IP): Performed by: STUDENT IN AN ORGANIZED HEALTH CARE EDUCATION/TRAINING PROGRAM

## 2021-04-15 PROCEDURE — 59856 INDUCED AB 1+VAG SUPP D&C: CPT | Performed by: OBSTETRICS & GYNECOLOGY

## 2021-04-15 PROCEDURE — 160009 HCHG ANES TIME/MIN: Performed by: OBSTETRICS & GYNECOLOGY

## 2021-04-15 PROCEDURE — 160035 HCHG PACU - 1ST 60 MINS PHASE I: Performed by: OBSTETRICS & GYNECOLOGY

## 2021-04-15 PROCEDURE — 700105 HCHG RX REV CODE 258: Performed by: STUDENT IN AN ORGANIZED HEALTH CARE EDUCATION/TRAINING PROGRAM

## 2021-04-15 PROCEDURE — 160041 HCHG SURGERY MINUTES - EA ADDL 1 MIN LEVEL 4: Performed by: OBSTETRICS & GYNECOLOGY

## 2021-04-15 PROCEDURE — RXMED WILLOW AMBULATORY MEDICATION CHARGE: Performed by: OBSTETRICS & GYNECOLOGY

## 2021-04-15 PROCEDURE — 10D17ZZ EXTRACTION OF PRODUCTS OF CONCEPTION, RETAINED, VIA NATURAL OR ARTIFICIAL OPENING: ICD-10-PCS | Performed by: OBSTETRICS & GYNECOLOGY

## 2021-04-15 PROCEDURE — 88305 TISSUE EXAM BY PATHOLOGIST: CPT

## 2021-04-15 PROCEDURE — 160029 HCHG SURGERY MINUTES - 1ST 30 MINS LEVEL 4: Performed by: OBSTETRICS & GYNECOLOGY

## 2021-04-15 PROCEDURE — 160002 HCHG RECOVERY MINUTES (STAT): Performed by: OBSTETRICS & GYNECOLOGY

## 2021-04-15 PROCEDURE — 700101 HCHG RX REV CODE 250: Performed by: STUDENT IN AN ORGANIZED HEALTH CARE EDUCATION/TRAINING PROGRAM

## 2021-04-15 PROCEDURE — 0UBGXZX EXCISION OF VAGINA, EXTERNAL APPROACH, DIAGNOSTIC: ICD-10-PCS | Performed by: OBSTETRICS & GYNECOLOGY

## 2021-04-15 RX ORDER — ACETAMINOPHEN 500 MG
500-1000 TABLET ORAL EVERY 6 HOURS PRN
COMMUNITY
Start: 2021-04-15

## 2021-04-15 RX ORDER — SODIUM CHLORIDE, SODIUM LACTATE, POTASSIUM CHLORIDE, CALCIUM CHLORIDE 600; 310; 30; 20 MG/100ML; MG/100ML; MG/100ML; MG/100ML
INJECTION, SOLUTION INTRAVENOUS
Status: DISCONTINUED | OUTPATIENT
Start: 2021-04-15 | End: 2021-04-15 | Stop reason: SURG

## 2021-04-15 RX ORDER — LIDOCAINE HCL/EPINEPHRINE/PF 2%-1:200K
VIAL (ML) INJECTION PRN
Status: DISCONTINUED | OUTPATIENT
Start: 2021-04-15 | End: 2021-04-15 | Stop reason: SURG

## 2021-04-15 RX ORDER — METHYLERGONOVINE MALEATE 0.2 MG/ML
INJECTION INTRAVENOUS PRN
Status: DISCONTINUED | OUTPATIENT
Start: 2021-04-15 | End: 2021-04-15 | Stop reason: SURG

## 2021-04-15 RX ORDER — CEFAZOLIN SODIUM 1 G/3ML
INJECTION, POWDER, FOR SOLUTION INTRAMUSCULAR; INTRAVENOUS PRN
Status: DISCONTINUED | OUTPATIENT
Start: 2021-04-15 | End: 2021-04-15 | Stop reason: SURG

## 2021-04-15 RX ORDER — IBUPROFEN 800 MG/1
800 TABLET ORAL EVERY 8 HOURS PRN
Qty: 30 TABLET | Refills: 0 | Status: SHIPPED | OUTPATIENT
Start: 2021-04-15

## 2021-04-15 RX ADMIN — FENTANYL CITRATE 50 MCG: 50 INJECTION, SOLUTION INTRAMUSCULAR; INTRAVENOUS at 00:34

## 2021-04-15 RX ADMIN — MIDAZOLAM 1 MG: 1 INJECTION INTRAMUSCULAR; INTRAVENOUS at 00:22

## 2021-04-15 RX ADMIN — SODIUM CHLORIDE, POTASSIUM CHLORIDE, SODIUM LACTATE AND CALCIUM CHLORIDE: 600; 310; 30; 20 INJECTION, SOLUTION INTRAVENOUS at 00:09

## 2021-04-15 RX ADMIN — LIDOCAINE HYDROCHLORIDE AND EPINEPHRINE 5 ML: 20; 5 INJECTION, SOLUTION EPIDURAL; INFILTRATION; INTRACAUDAL; PERINEURAL at 00:04

## 2021-04-15 RX ADMIN — LIDOCAINE HYDROCHLORIDE AND EPINEPHRINE 5 ML: 20; 5 INJECTION, SOLUTION EPIDURAL; INFILTRATION; INTRACAUDAL; PERINEURAL at 00:13

## 2021-04-15 RX ADMIN — LIDOCAINE HYDROCHLORIDE AND EPINEPHRINE 5 ML: 20; 5 INJECTION, SOLUTION EPIDURAL; INFILTRATION; INTRACAUDAL; PERINEURAL at 00:20

## 2021-04-15 RX ADMIN — METHYLERGONOVINE MALEATE 200 MCG: 0.2 INJECTION, SOLUTION INTRAMUSCULAR; INTRAVENOUS at 00:39

## 2021-04-15 RX ADMIN — MIDAZOLAM 1 MG: 1 INJECTION INTRAMUSCULAR; INTRAVENOUS at 00:20

## 2021-04-15 RX ADMIN — CEFAZOLIN 2 G: 330 INJECTION, POWDER, FOR SOLUTION INTRAMUSCULAR; INTRAVENOUS at 00:18

## 2021-04-15 RX ADMIN — FENTANYL CITRATE 50 MCG: 50 INJECTION, SOLUTION INTRAMUSCULAR; INTRAVENOUS at 00:40

## 2021-04-15 ASSESSMENT — PAIN SCALES - GENERAL
PAIN_LEVEL: 2
PAIN_LEVEL: 2

## 2021-04-15 NOTE — PROGRESS NOTES
Spiritual Care Note    Patient Information     Patient's Name: Kash Patel   MRN: [id number]    YOB: 1995   Age and Gender: 25 y.o. female   Service Area: Labor and Delivery [facility]   Room (and Bed): 26/   Ethnicity or Nationality:     Primary Language: Qatari   Caodaism/Spiritual preference: Non-Caodaism   Place of Residence: [place]   Family/Friends/Others Present: Yes   Clinical Team Present: Yes   Medical Diagnosis(-es)/Procedure(s): Inevitable     Code Status: Full Code    Date of Admission: 2021   Length of Stay: 1 days        Spiritual Care Provider Information:  Name of Spiritual Care Provider: Afshin Camarena  Title of Spiritual Care Provider: Associate   Phone Number: [phone]  E-mail: [email or web address]  Total time : 20 minutes    Spiritual Screen Results:    Gen Nursing        Palliative Care         Encounter/Request Information    Encounter/Request Type   Visited With: Patient and family together    Nature of the Visit: Initial, Call back    Crisis Visit: Major loss(Fetal Demise )    Referral From/ Origin of Request: Verbal staff, Phone    Religous Needs/Values    Caodaism Needs Visit  Caodaism Needs: Prayer, Scripture    Ritual Needs Visit  Ritual Needs: Episcopalian    Spiritual Assessment     Spiritual Care Encounters  Observations/Symptoms: Thankfulness, Accepting, Resignation, Tearful    Interacton/Conversation:  used a Qatari interpreture. Pt and pt's partner wanted to have her baby baptised.  consoled them both in their grief and preformed a Shinto over the baby. Pt thanked  for coming.     Assessment: Need    Need: Seeking Spiritual Assistance and Support    Intended Effects: Build Relationship of Care and Support, Demonstrate Caring and Concern, Mckayla Affirmation, Helping Someone Feel Comforted, Journeying With Someone in Grief Process, Preserve Dignity and Respect, Promote a Sense of  Peace    Interventions: Compassionate Presence, Sacrement of Voodoo, Prayer    Outcomes: Acceptance, Coping, Spiritual Comfort    Plan: Visit Upon Request    Notes:

## 2021-04-15 NOTE — ANESTHESIA PREPROCEDURE EVALUATION
26 yo F, coming back to the OR for retained placenta. Pt already has epidural.    Relevant Problems   NEURO   (+) Acute intractable headache      OB   (+) History of C/S x 2 - needs repeat, no BTL       Physical Exam    Airway   Mallampati: II  TM distance: >3 FB  Neck ROM: full       Cardiovascular - normal exam  Rhythm: regular  Rate: normal  (-) murmur     Dental - normal exam           Pulmonary - normal exam  Breath sounds clear to auscultation     Abdominal    Neurological - normal exam                 Anesthesia Plan    ASA 2       Plan - epidural   Neuraxial block will be primary anesthetic                  Pertinent diagnostic labs and testing reviewed    Informed Consent:    Anesthetic plan and risks discussed with patient.

## 2021-04-15 NOTE — PROGRESS NOTES
1900: Report to Vikash PAYTON. POC discussed. Pt denies pain or any feeling of pressure at this time. Denies further needs at this time. Pt encouraged to call to this RN with any changes. Pt is Chinese speaking only. FOB at bedside.     2105: Pt called this RN into the room pointing near vagina stating she can feel something coming. Fetal parts noted in vagina, Dr Hebert called into room. Vladimir at bedside for translation.     2108: Fetal body remains in vagina and fetal head entrapped behind cervix. Additional dose of Cytotec given per MAR. Pt questions answered by Vladimir and Dr Hebert.     2338: Received call from pt stating she had increased pain and pressure. More fetal parts noted in vagina and on bed. Dr Hebert called to bedside. Vladimir called to bedside for interpretation.     2340: Dr Hebert at bedside. Fetus delivered in bed with no signs of life. Nuchal x1. Decision made to go to OR for delivery of placenta.     0009: Into OR 3 for D&C.     0102: Pt out of OR and into 226 for recovery.     0630: Dr Hebert at bedside discussing discharge planning. Plan to discharge pt this AM. Pt and spouse still deciding on disposition for baby. Resources given and questions answered by Vladimir PAYTON. Infant in mother's arms.     0700: Report to Gregory PAYTON. POC discussed.

## 2021-04-15 NOTE — ANESTHESIA POSTPROCEDURE EVALUATION
Patient: Kash Patel    Procedure Summary     Date: 04/15/21 Room / Location: [facility] OR 03 / SURGERY LABOR AND DELIVERY    Anesthesia Start: 0009 Anesthesia Stop: 0100    Procedure: DILATION AND CURETTAGE (Vagina ) Diagnosis: (RETAINED PLACENTA.)    Surgeons: Jessie Moore M.D. Responsible Provider: Davy Rodriguez M.D.    Anesthesia Type: neuraxial block ASA Status: 2          Final Anesthesia Type: neuraxial block  Last vitals  BP   Blood Pressure: (!) 94/57    Temp   36.6 °C (97.9 °F)    Pulse   98   Resp   16    SpO2   97 %      Anesthesia Post Evaluation    Patient location during evaluation: bedside  Patient participation: complete - patient participated  Level of consciousness: awake and alert  Pain score: 2    Airway patency: patent  Anesthetic complications: no  Cardiovascular status: hemodynamically stable  Respiratory status: acceptable  Hydration status: euvolemic    PONV: none    patient able to participate, but full recovery from regional anesthesia has not occurred and is not expected within the stipulated timeframe for the completion of the evaluation      No complications documented.     Nurse Pain Score: 2 (NPRS)

## 2021-04-15 NOTE — PROGRESS NOTES
Called to room for delivery.  On arrival pt noted to have delivered fetus in bed without signs of life.  Nuchal cord x1 reduced, cord clamped and infant placed with mom.  Trailing umbilical cord palpable through cervix.  Inferior edge of placenta palpable digitally, cervix 3cm dilated.  Inferior edge able to be grasped in cervix with ring forcep however not able to tease the placenta down through the cervix.  Placenta started to shred with gentle traction and decision made to stop attempt at placenta delivery.  Small bleeding noted.  Recommend to pt to go to OR for D&C for retained placenta.     With in person RN interpretor, Ashley Riggs, discussed procedure and risks with pt including pain/bleeding/infection, risk of uterine perforation and damage to surrounding structures.  Reviewed risks of transfusion including transfusion reaction (fever, damage to heart/lungs/kidneys), risk of exposure to infectious disease including HIV and hepatitis.  All questions answered.       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      Pt to be taken to OR now for D&C.  EBL in room 300cc      Jessie Moore MD  [facility], Women's Health

## 2021-04-15 NOTE — PROGRESS NOTES
Labor Progress Note:      S:  Pt reports small amount of pressure.  Has not received any medication    Vitals:    21 1517 21 1533 21 1548 21 1603   BP: (!) 92/51 (!) 92/52 (!) 91/55 (!) 92/53   Pulse: 88 88 93 85   Resp:       Temp:       TempSrc:       SpO2:       Weight:       Height:           SVE: fetal body palpable in vagina; pt asked to push without further delivery and pt instructed to stop.  Head palpable in cervix, cervix approximately 3-4cm dilated around head which is entrapped; so signs of life currently.       A/P: 25 y.o.  @ 17w5d admitted with spontaneous  labor/PPROM  Progressing spontaneously however now with head entrapment in cervix; 400mcg cytotec placed, first dose.  No active bleeding.    Comfortable w/ epidural.    Jessie Moore MD  [facility], [facility]       Addendum  Repeat SVE unchanged, still with head entrapment, no signs of life.  Additional 400mcg cytotec placed PV.    Discussed with in person RN  possibility of genetic evaluation if desired.  Also discussed often do not find a reason for pregnancy loss like what she is experiencing.   expressed concern that this was caused by patient previously having an IUD which was removed prior to this pregnancy.  Discussed that many women use IUD and there is no evidence that prior IUD use increases the risk of pregnancy complications or miscarriage.  Pt to consider genetics.    Jessie Moore MD  [facility], [facility]

## 2021-04-15 NOTE — ANESTHESIA POSTPROCEDURE EVALUATION
Patient: Kash Patel    Procedure Summary     Date: 04/14/21 Room / Location:     Anesthesia Start: 1416 Anesthesia Stop: 2354    Procedure: Labor Epidural Diagnosis:     Scheduled Providers:  Responsible Provider: Davy Rodriguez M.D.    Anesthesia Type: epidural ASA Status: 2          Final Anesthesia Type: epidural  Last vitals  BP   Blood Pressure: (!) 94/57    Temp   36.6 °C (97.9 °F)    Pulse   98   Resp   16    SpO2   97 %      Anesthesia Post Evaluation    Patient location during evaluation: bedside  Patient participation: complete - patient participated  Level of consciousness: awake and alert  Pain score: 2    Airway patency: patent  Anesthetic complications: no  Cardiovascular status: hemodynamically stable  Respiratory status: acceptable  Hydration status: euvolemic    PONV: none    patient able to participate, but full recovery from regional anesthesia has not occurred and is not expected within the stipulated timeframe for the completion of the evaluation      No complications documented.     Nurse Pain Score: 2 (NPRS)

## 2021-04-15 NOTE — ANESTHESIA TIME REPORT
Anesthesia Start and Stop Event Times     Date Time Event    4/15/2021 0004 Ready for Procedure     0009 Anesthesia Start     0100 Anesthesia Stop        Responsible Staff  04/15/21    Name Role Begin End    Min JOZEF Rodriguez M.D. Anesth 0009 0100        Preop Diagnosis (Free Text):  Pre-op Diagnosis     RETAINED PLACENTA.        Preop Diagnosis (Codes):    Post op Diagnosis  Retained placenta      Premium Reason  A. 3PM - 7AM    Comments:

## 2021-04-15 NOTE — DISCHARGE PLANNING
Meds-to-Beds: Discharge prescription order listed below delivered to patient's bedside. RN notified. Patient and patient's SO counseled in Somali with assistance of pharmacy technician Judith. Co-payment processed by pharmacy at bedside.      Kash Chapa   Home Medication Instructions ALBIN:[id number]    Printed on:04/15/21 1033   Medication Information                      ibuprofen (MOTRIN) 800 MG Tab  Take 1 tablet by mouth every 8 hours as needed (cramping).               Kristie Anthony, PharmD

## 2021-04-15 NOTE — ANESTHESIA TIME REPORT
Anesthesia Start and Stop Event Times     Date Time Event    2021 1415 Ready for Procedure     1416 Anesthesia Start     2354 Anesthesia Stop        Responsible Staff  21    Name Role Begin End    Min JOZEF Rodriguez M.D. Anesth 1416 [id number]        Preop Diagnosis (Free Text):  Pre-op Diagnosis             Preop Diagnosis (Codes):    Post op Diagnosis   labor      Premium Reason  A. 3PM - 7AM    Comments:

## 2021-04-15 NOTE — PROGRESS NOTES
OB Progress Note    Subjective:   Pt reports feeling well, minimal pain, minimal bleeding.      24hr VS:  Temp:  [36.6 °C (97.9 °F)] 36.6 °C (97.9 °F)  Pulse:  [] 98  Resp:  [16-20] 16  BP: ()/(51-70) 94/57  SpO2:  [97 %-100 %] 97 %      gen: AAO, NAD  Abd: soft, ND, nontender; fundus palpable below umbilcus  : trace lochia  Ext: no edema bilaterally    Meds:  Current Facility-Administered Medications:   •  ibuprofen (MOTRIN) tablet 800 mg, 800 mg, Oral, Q8HRS PRN, Jessie Moore M.D.  •  acetaminophen (TYLENOL) tablet 1,000 mg, 1,000 mg, Oral, Q6HRS PRN, Jessie Moore M.D., 1,000 mg at 21 1644        A/P: 25 y.o.  s/p  at 17w5d with IUFD after PPROM/PTL followed by D&C for retained placenta also with biopsy of cervical lesions.  Discussed again w/ pt genetics - pt declines genetic studies at this time  Rh+  Will have close f/u with me in office      D/c home now.       Jessie Moore MD  [facility], Women's Health

## 2021-04-15 NOTE — PROGRESS NOTES
Late entry:   07-Report received from SARA Amanda RN, accepted care of pt. Pt is primarily Divehi speaking. Pt accepts this RN as , is aware that this RN is not an official  in this facility. Pt is s/p for FD at 17.5 GA. Pt of [facility], . SO [place] at bedside. Pt and SO grieving appropriately. Baby at bedside, still deciding on disposition for baby. Pt awaiting for meal tray. Pt to d/c later today.  1112- d/c education reviewed with pt and SO. This RN provided as . Momento box provided. IV d/c'd. Pt is to F/U with [facility] in 2 weeks. Pt left in care of SO.   1119- scheduling called to cancel  OB appointment and  US appointment. Pt aware to call office for f/u in 2 weeks

## 2021-04-15 NOTE — PROGRESS NOTES
Spoke w/ pt regarding option of genetics via in person RN .   Pt would like genetics if covered by insurance; if without coverage would like to consider.  Will try to obtain information regarding coverage in the AM.    Jessie Moore MD  [facility], [facility]

## 2021-04-15 NOTE — OP REPORT
Operative Report    PreOp Diagnosis:   1. Retained placenta s/p 17wk delivery    PostOp Diagnosis:   1. S/p D&C for retained placenta  2. Right lateral cervical lesion    Procedure(s):  DILATION AND CURETTAGE  Biopsy of vaginal lesion    Surgeon(s):  Jessie Moore M.D.    Anesthesiologist/Type of Anesthesia:  Anesthesiologist: Davy Rodriguez M.D./epidural    Pathology:  Placenta  Right lateral cervical lesion biopsy    Estimated Blood Loss: 100cc    Findings: large portion of placenta removed with ring forceps, remainder with suction D&C as below under direct visualization.  Uterus 16 weeks size, cervix 3 cm dilated.  A small cluster of palpably nodular and warty in appearance lesions on the right lateral side of the cervix from approximately 10-8 o'clock    Complications: none      Procedure in Detail:  Pt is brought to the operating room where general anesthesia was initiated.  She was prepped and draped in the normal sterile fashion in dorsolithotomy position using John stirrups.  Mcelroy catheter was in place from the delivery room room.  The lower edge of the placenta was grasped with ring forceps and a large portion of placenta was able to be removed.  The remaining placenta could not be palpated easily.  Transabdominal ultrasound was performed with concern for additional products.  A speculum was inserted and the anterior lip of the cervix was grasped with a ring forcep.  Direct ultrasound guidance, a size 14 curved rigid suction tip was easily inserted into the uterus up to the fundus and under continued ultrasound visualization, suction was applied with aspiration of products of conception.  A second pass was made also under direct visualization.  A bovine curette was advanced to the fundus under direct ultrasound visualization and the endometrium gently curetted ensuring a gritty texture was appreciated circumferentially.  Ultrasound at this time of the endometrial stripe revealed a thin, homogenous  stripe with no evidence of additional retained products of conception.  At this point there is a small amount of bleeding noted from the uterus for which 0.2 mg IM Methergine was given.  All instruments removed from the vagina and bimanual exam was performed at which time nodular lesions were populated on the right lateral cervix.  Speculum was reinserted to visualize the small lesions as noted above.  Several of these were grasped with ring forcep and small sample was easily able to be twisted off with resulting hemostasis noted.  These biopsies were labeled and sent to pathology.  At this point, all instruments were removed from the vagina with hemostasis noted.  The patient was taken back to LDR in stable condition.  All counts were correct.    Jessie Moore MD  [facility], [facility]

## 2021-04-27 ENCOUNTER — GYNECOLOGY VISIT (OUTPATIENT)
Dept: OBGYN | Facility: CLINIC | Age: 26
End: 2021-04-27
Payer: MEDICAID

## 2021-04-27 VITALS — BODY MASS INDEX: 25.23 KG/M2 | DIASTOLIC BLOOD PRESSURE: 64 MMHG | WEIGHT: 147 LBS | SYSTOLIC BLOOD PRESSURE: 100 MMHG

## 2021-04-27 DIAGNOSIS — O03.9 SAB (SPONTANEOUS ABORTION): ICD-10-CM

## 2021-04-27 PROCEDURE — 99213 OFFICE O/P EST LOW 20 MIN: CPT | Mod: 24 | Performed by: OBSTETRICS & GYNECOLOGY

## 2021-04-27 RX ORDER — LEVONORGESTREL AND ETHINYL ESTRADIOL 0.1-0.02MG
1 KIT ORAL DAILY
Qty: 84 TABLET | Refills: 4 | Status: SHIPPED | OUTPATIENT
Start: 2021-04-27

## 2021-04-27 ASSESSMENT — FIBROSIS 4 INDEX: FIB4 SCORE: 0.53

## 2021-04-27 NOTE — PROGRESS NOTES
OB/GYN     Cc: f/u miscarriage    Visit performed with interpretor #[id number]  HPI: 25 y.o.  here for f/u of recent miscarriage.  Admitted with  labor/SROM at 17w5d, fetus passed away intrapartum prior to delivery.  Pt did require D&C for retained placenta. Also to have found lesions on cervix suspicious for warts during D&C which were biopsies    Pt reports doing OK but feeling sad.  Was trying not to think about it but when she saw a pregnant woman today it made her sad.    EDPS 23; no thoughts of self harm.    Minimal spotting/can be dark with rare cramping.     Would like another child but reports she is scared.      ROS:  Gen: denies fevers, general concerns  Abd: denies abdominal pain  Gu: denies heavy vaginal bleeding, discharge, pain    OB History    Para Term  AB Living   4 3 2   1 2   SAB TAB Ectopic Molar Multiple Live Births   1       0 2      # Outcome Date GA Lbr Hussain/2nd Weight Sex Delivery Anes PTL Lv   4 Para 21 17w5d  0.17 kg (6 oz)  Vag-Breech EPI Y FD      Complications:  labor   3 Term 10/08/18 40w0d  2.2 kg (4 lb 13.6 oz) M CS-Unspec EPI, Spinal N BRITTANY      Birth Comments: C/Section for repeat   2 Term 06/19/15 40w0d  3.3 kg (7 lb 4.4 oz) M CS-Unspec Spinal N BRITTANY      Birth Comments: C/Section dut ot unable to dilate   1 SAB  12w0d    SAB         Birth Comments: Pt states had an infection. D&C needed       Past Medical History:   Diagnosis Date   • Hypertension     PIH   • Normocytic anemia 3/27/2021       /64   Wt 66.7 kg (147 lb)   LMP 2020   BMI 25.23 kg/m²   Gen; AAO, NAD  Gu:  deferred    A. Right lateral cervical lesion:          Most consistent with condyloma.          Separate small fragments of benign endocervical glandular mucosa           with areas of squamous metaplasia also identified.          No dysplasia or malignancy identified   B. Placenta:          Second trimester hampton placenta with immature vascularized            chorionic villi, areas of intervillous and subchorionic fibrin           deposition, and areas of hemorrhage, 102 gr trimmed weight.          Trivascular umbilical cord demonstrate mild acute funisitis.          Unremarkable fetal membranes.          Negative for chorioamnionitis and funisitis.     A/P: 25 y.o.  with recent PTL/ROM with delivery at 17w5d, D&C for retained placenta  - discussed we most often do not find a reason for outcomes like this; fetus did have slightly abnormal faces on gross examination after delivery but pt declined genetics secondary to concern for cost.  Discussed that with a hx of 2 full term deliveries if she tries again there is a high chance she will not have a recurrent poor outcome but difficult to give an accurate chance.  Recommend ot cont PNV if she is considering pregnancy at all and to wait until she is ready.    - discussed placental pathology; did not discuss during visit condyloma result in biopsy specimen - will have MA call to inform pt, nothing needs to be done for this now.  - concern for depression today with EDPS 23; discussed depression in this scenario is common and offered support.  Recommend referral to counselor which pt is accepting of, placed today.    - offered contraception, pt desires KENYA - rx sent to [facility]    F/U: prn/annually    Jessie Moore MD  [facility], Women's Health

## 2021-04-28 ENCOUNTER — TELEPHONE (OUTPATIENT)
Dept: OBGYN | Facility: CLINIC | Age: 26
End: 2021-04-28

## 2021-04-28 NOTE — TELEPHONE ENCOUNTER
----- Message from Jessie Moore M.D. sent at 4/27/2021  4:22 PM PDT -----  Regarding: Pathology result  I forgot to tell pt today during her visit that the small biopsies we took during her recent D&C showed warts on her cervix which she doesn't need to do anything about, just to continue to get her routine pap smears when they are scheduled.  Please call her and let me know if she has any questions.    Thanks!  Dr Hebert    4/28/21 1645 Spoke with pt and informed her of above. Pt understood and had no questions or concerns

## 2021-04-29 ENCOUNTER — TELEPHONE (OUTPATIENT)
Dept: OBGYN | Facility: CLINIC | Age: 26
End: 2021-04-29

## 2021-04-29 NOTE — LETTER
04/29/21          Kash Leeos por favor tenemos vasyl informacion pendiente con respecto a griffith cuidado medico.  Vasyl de las enfermeras está disponible para hablar con usted de Lunes a Viernes de 8 a.m. - 12 p.m. o de 1 p.m. - 5 p.m.    Llamenos por favor al [phone]; y javier por griffith pronta atención.          Sinceramente,          Jessie Moore M.D.    Firmado Electrónicamente

## 2021-04-29 NOTE — TELEPHONE ENCOUNTER
Called pt and notified her of below information unable to contact her voice message not set up, letter sent.      Message from Ena Ram Med Ass't sent at 4/28/2021 10:23 AM PDT -----  Regarding: FW: Pathology result  This patient speaks Syriac  ----- Message -----  From: Jory Kerr Med Ass't  Sent: 4/28/2021  10:13 AM PDT  To: Ena Ram [facility]  Subject: FW: Pathology result                               ----- Message -----  From: Jessie Moore M.D.  Sent: 4/27/2021   4:22 PM PDT  To: [facility]  Subject: Pathology result                                 I forgot to tell pt today during her visit that the small biopsies we took during her recent D&C showed warts on her cervix which she doesn't need to do anything about, just to continue to get her routine pap smears when they are scheduled.  Please call her and let me know if she has any questions.    Thanks!  Dr Hebert           General

## (undated) DEVICE — WATER IRRIGATION STERILE 1000ML (12EA/CA)

## (undated) DEVICE — GLOVE BIOGEL SZ 8 SURGICAL PF LTX - (50PR/BX 4BX/CA)

## (undated) DEVICE — LACTATED RINGERS INJ 1000 ML - (14EA/CA 60CA/PF)

## (undated) DEVICE — CURRETTE TIP VAC CURV 16MM (10EA/BX)

## (undated) DEVICE — FILTER ASPIRATION SAFE TOUCH

## (undated) DEVICE — SET EXTENSION WITH 2 PORTS (48EA/CA) ***PART #2C8610 IS A SUBSTITUTE*****

## (undated) DEVICE — TRAY SRGPRP PVP IOD WT PRP - (20/CA)

## (undated) DEVICE — KIT  I.V. START (100EA/CA)

## (undated) DEVICE — TUBING D & E COLLECTION SET (50EA/PK)

## (undated) DEVICE — VACURETTE 10MM CURVED 10/PKG

## (undated) DEVICE — TUBING SCT 18IN BR SFTCH BTL - 10/BX GOES WITH 77350

## (undated) DEVICE — VACURETTE 'F' TIP 8MM 10/PKG

## (undated) DEVICE — CATHETER IV NON-SAFETY 18 GA X 1 1/4 (50/BX 4BX/CA)

## (undated) DEVICE — HEAD HOLDER JUNIOR/ADULT

## (undated) DEVICE — VACURETTE 14MM CURVED  1/2IN BASE (10EA/PK)

## (undated) DEVICE — TRAP TISSUE SAFETOUCH

## (undated) DEVICE — VACURETTE 12MM CURVED 10/PKG

## (undated) DEVICE — TUBING CLEARLINK DUO-VENT - C-FLO (48EA/CA)

## (undated) DEVICE — PACK ROOM TURNOVER L&D (12/CA)

## (undated) DEVICE — BOTTLE SECONDARY SAFE TOUCH - W/SEAL CUP(10/BX)

## (undated) DEVICE — BOTTLE COLLECTION CANISTER LG - (10/PK)

## (undated) DEVICE — DETERGENT RENUZYME PLUS 10 OZ PACKET (50/BX)

## (undated) DEVICE — VACURETTE 8MM CURVED 10/PKG